# Patient Record
Sex: MALE | Race: BLACK OR AFRICAN AMERICAN | Employment: FULL TIME | URBAN - METROPOLITAN AREA
[De-identification: names, ages, dates, MRNs, and addresses within clinical notes are randomized per-mention and may not be internally consistent; named-entity substitution may affect disease eponyms.]

---

## 2017-04-26 ENCOUNTER — HOSPITAL ENCOUNTER (EMERGENCY)
Age: 22
Discharge: HOME | End: 2017-04-27
Attending: EMERGENCY MEDICINE
Payer: COMMERCIAL

## 2017-04-26 ENCOUNTER — APPOINTMENT (OUTPATIENT)
Dept: CT IMAGING | Age: 22
End: 2017-04-26
Attending: EMERGENCY MEDICINE
Payer: COMMERCIAL

## 2017-04-26 ENCOUNTER — APPOINTMENT (OUTPATIENT)
Dept: GENERAL RADIOLOGY | Age: 22
End: 2017-04-26
Attending: EMERGENCY MEDICINE
Payer: COMMERCIAL

## 2017-04-26 DIAGNOSIS — R06.00 DYSPNEA, UNSPECIFIED TYPE: Primary | ICD-10-CM

## 2017-04-26 DIAGNOSIS — R77.8 ELEVATED TROPONIN: ICD-10-CM

## 2017-04-26 LAB
ALBUMIN SERPL BCP-MCNC: 4 G/DL (ref 3.5–5)
ALBUMIN/GLOB SERPL: 1.1 {RATIO} (ref 1.1–2.2)
ALP SERPL-CCNC: 54 U/L (ref 45–117)
ALT SERPL-CCNC: 56 U/L (ref 12–78)
AMPHET UR QL SCN: NEGATIVE
ANION GAP BLD CALC-SCNC: 10 MMOL/L (ref 5–15)
AST SERPL W P-5'-P-CCNC: 22 U/L (ref 15–37)
BARBITURATES UR QL SCN: NEGATIVE
BASOPHILS # BLD AUTO: 0 K/UL (ref 0–0.1)
BASOPHILS # BLD: 0 % (ref 0–1)
BENZODIAZ UR QL: NEGATIVE
BILIRUB SERPL-MCNC: 0.4 MG/DL (ref 0.2–1)
BUN SERPL-MCNC: 13 MG/DL (ref 6–20)
BUN/CREAT SERPL: 11 (ref 12–20)
CALCIUM SERPL-MCNC: 8.8 MG/DL (ref 8.5–10.1)
CANNABINOIDS UR QL SCN: NEGATIVE
CHLORIDE SERPL-SCNC: 103 MMOL/L (ref 97–108)
CO2 SERPL-SCNC: 29 MMOL/L (ref 21–32)
COCAINE UR QL SCN: NEGATIVE
CREAT SERPL-MCNC: 1.22 MG/DL (ref 0.7–1.3)
D DIMER PPP FEU-MCNC: 0.19 MG/L FEU (ref 0–0.65)
DRUG SCRN COMMENT,DRGCM: NORMAL
EOSINOPHIL # BLD: 0.3 K/UL (ref 0–0.4)
EOSINOPHIL NFR BLD: 3 % (ref 0–7)
ERYTHROCYTE [DISTWIDTH] IN BLOOD BY AUTOMATED COUNT: 12.3 % (ref 11.5–14.5)
GLOBULIN SER CALC-MCNC: 3.8 G/DL (ref 2–4)
GLUCOSE SERPL-MCNC: 84 MG/DL (ref 65–100)
HCT VFR BLD AUTO: 47.1 % (ref 36.6–50.3)
HGB BLD-MCNC: 15.5 G/DL (ref 12.1–17)
LYMPHOCYTES # BLD AUTO: 22 % (ref 12–49)
LYMPHOCYTES # BLD: 1.9 K/UL (ref 0.8–3.5)
MCH RBC QN AUTO: 28 PG (ref 26–34)
MCHC RBC AUTO-ENTMCNC: 32.9 G/DL (ref 30–36.5)
MCV RBC AUTO: 85.2 FL (ref 80–99)
METHADONE UR QL: NEGATIVE
MONOCYTES # BLD: 1.1 K/UL (ref 0–1)
MONOCYTES NFR BLD AUTO: 13 % (ref 5–13)
NEUTS SEG # BLD: 5.5 K/UL (ref 1.8–8)
NEUTS SEG NFR BLD AUTO: 62 % (ref 32–75)
OPIATES UR QL: NEGATIVE
PCP UR QL: NEGATIVE
PLATELET # BLD AUTO: 234 K/UL (ref 150–400)
POTASSIUM SERPL-SCNC: 3.7 MMOL/L (ref 3.5–5.1)
PROT SERPL-MCNC: 7.8 G/DL (ref 6.4–8.2)
RBC # BLD AUTO: 5.53 M/UL (ref 4.1–5.7)
SODIUM SERPL-SCNC: 142 MMOL/L (ref 136–145)
TROPONIN I SERPL-MCNC: 0.09 NG/ML
TROPONIN I SERPL-MCNC: 0.13 NG/ML
WBC # BLD AUTO: 8.9 K/UL (ref 4.1–11.1)

## 2017-04-26 PROCEDURE — 84484 ASSAY OF TROPONIN QUANT: CPT | Performed by: EMERGENCY MEDICINE

## 2017-04-26 PROCEDURE — 71020 XR CHEST PA LAT: CPT

## 2017-04-26 PROCEDURE — 96361 HYDRATE IV INFUSION ADD-ON: CPT

## 2017-04-26 PROCEDURE — 99285 EMERGENCY DEPT VISIT HI MDM: CPT

## 2017-04-26 PROCEDURE — 96360 HYDRATION IV INFUSION INIT: CPT

## 2017-04-26 PROCEDURE — 85025 COMPLETE CBC W/AUTO DIFF WBC: CPT | Performed by: EMERGENCY MEDICINE

## 2017-04-26 PROCEDURE — 85379 FIBRIN DEGRADATION QUANT: CPT | Performed by: EMERGENCY MEDICINE

## 2017-04-26 PROCEDURE — 74011250637 HC RX REV CODE- 250/637: Performed by: EMERGENCY MEDICINE

## 2017-04-26 PROCEDURE — 80053 COMPREHEN METABOLIC PANEL: CPT | Performed by: EMERGENCY MEDICINE

## 2017-04-26 PROCEDURE — 80307 DRUG TEST PRSMV CHEM ANLYZR: CPT | Performed by: EMERGENCY MEDICINE

## 2017-04-26 PROCEDURE — 71275 CT ANGIOGRAPHY CHEST: CPT

## 2017-04-26 PROCEDURE — 74011250636 HC RX REV CODE- 250/636: Performed by: EMERGENCY MEDICINE

## 2017-04-26 PROCEDURE — 93005 ELECTROCARDIOGRAM TRACING: CPT

## 2017-04-26 PROCEDURE — 36415 COLL VENOUS BLD VENIPUNCTURE: CPT | Performed by: EMERGENCY MEDICINE

## 2017-04-26 PROCEDURE — 74011636320 HC RX REV CODE- 636/320: Performed by: EMERGENCY MEDICINE

## 2017-04-26 RX ORDER — SODIUM CHLORIDE 0.9 % (FLUSH) 0.9 %
10 SYRINGE (ML) INJECTION
Status: COMPLETED | OUTPATIENT
Start: 2017-04-26 | End: 2017-04-26

## 2017-04-26 RX ORDER — METOPROLOL TARTRATE 25 MG/1
25 TABLET, FILM COATED ORAL
Status: COMPLETED | OUTPATIENT
Start: 2017-04-26 | End: 2017-04-26

## 2017-04-26 RX ORDER — SODIUM CHLORIDE 9 MG/ML
50 INJECTION, SOLUTION INTRAVENOUS
Status: COMPLETED | OUTPATIENT
Start: 2017-04-26 | End: 2017-04-26

## 2017-04-26 RX ORDER — GUAIFENESIN 100 MG/5ML
81 LIQUID (ML) ORAL
Status: COMPLETED | OUTPATIENT
Start: 2017-04-26 | End: 2017-04-26

## 2017-04-26 RX ADMIN — SODIUM CHLORIDE 1000 ML: 900 INJECTION, SOLUTION INTRAVENOUS at 21:44

## 2017-04-26 RX ADMIN — Medication 10 ML: at 21:20

## 2017-04-26 RX ADMIN — IOPAMIDOL 80 ML: 755 INJECTION, SOLUTION INTRAVENOUS at 21:20

## 2017-04-26 RX ADMIN — ASPIRIN 81 MG CHEWABLE TABLET 81 MG: 81 TABLET CHEWABLE at 22:21

## 2017-04-26 RX ADMIN — SODIUM CHLORIDE 50 ML/HR: 900 INJECTION, SOLUTION INTRAVENOUS at 21:20

## 2017-04-26 RX ADMIN — METOPROLOL TARTRATE 25 MG: 25 TABLET ORAL at 22:21

## 2017-04-26 NOTE — ED NOTES
Bedside and Verbal shift change report given to Connor Jackman (oncoming nurse) by Minerva Lemus (offgoing nurse). Report included the following information SBAR, Kardex and Intake/Output.

## 2017-04-26 NOTE — ED PROVIDER NOTES
HPI Comments: Albino Shook is a 22 y.o. male with PMHx of migraines and seasonal allergies presenting ambulatory to [facility] c/o SOB and lightheadedness earlier today while at work. Pt notes that he had his episode of SOB at ~1615. He reports that EMS was called then, and he was brought to [facility]. However, the wait was too long, and while he was waiting he never saw a physician. Pt notes that he was fine this morning, and he felt normal at work earlier today. He reports that he does have allergies, but has not had any bad allergies recently. Per pt, his mother has hx of asthma. He denies chest pain, nausea, vomiting, coughing, increased stress, hx of asthma, use of allergy medications, daily medications, or ever using an inhaler. No recent plane travel; no recent trauma or surgery; no recent hospitalizations; no hemoptysis; no prior hx of dvt/pe;       PCP: Sarah Ng MD    There are no other complaints, changes, or physical findings at this time. The history is provided by the patient. No  was used. Past Medical History:   Diagnosis Date    H/O seasonal allergies     Headache     Migraines        History reviewed. No pertinent surgical history. History reviewed. No pertinent family history. Social History     Social History    Marital status: SINGLE     Spouse name: N/A    Number of children: N/A    Years of education: N/A     Occupational History    Not on file. Social History Main Topics    Smoking status: Never Smoker    Smokeless tobacco: Never Used    Alcohol use No    Drug use: No    Sexual activity: No     Other Topics Concern    Not on file     Social History Narrative         ALLERGIES: Review of patient's allergies indicates no known allergies. Review of Systems   Constitutional: Negative for activity change, chills and fever. HENT: Negative for congestion and sore throat. Eyes: Negative for pain and redness.    Respiratory: Positive for shortness of breath. Negative for cough and chest tightness. Cardiovascular: Negative for chest pain and palpitations. Gastrointestinal: Negative for abdominal pain, diarrhea, nausea and vomiting. Genitourinary: Negative for dysuria, frequency and urgency. Musculoskeletal: Negative for back pain and neck pain. Skin: Negative for rash. Neurological: Positive for light-headedness. Negative for syncope and headaches. Psychiatric/Behavioral: Negative for confusion. All other systems reviewed and are negative. Patient Vitals for the past 12 hrs:   BP SpO2   04/27/17 0730 122/72 98 %   04/27/17 0700 121/78 97 %   04/27/17 0630 120/83 97 %   04/27/17 0600 129/78 96 %   04/27/17 0543 119/69 96 %   04/27/17 0500 113/63 96 %   04/27/17 0430 116/75 95 %   04/27/17 0400 116/68 96 %   04/27/17 0330 152/63 96 %   04/27/17 0300 133/69 96 %   04/27/17 0230 117/72 95 %   04/27/17 0200 118/76 95 %   04/27/17 0130 132/81 97 %   04/27/17 0115 - 99 %   04/27/17 0114 131/78 -   04/26/17 2345 (!) 134/95 98 %   04/26/17 2330 128/73 100 %   04/26/17 2315 140/86 98 %   04/26/17 2300 135/79 98 %   04/26/17 2245 142/70 98 %   04/26/17 2230 131/83 99 %   04/26/17 2215 142/89 100 %   04/26/17 2045 136/83 99 %   04/26/17 2031 132/73 99 %            Physical Exam   Constitutional: He is oriented to person, place, and time. He appears well-developed and well-nourished. No distress. HENT:   Head: Normocephalic. Nose: Nose normal.   Mouth/Throat: Oropharynx is clear and moist. No oropharyngeal exudate. TMs clear bilaterally   Eyes: Conjunctivae are normal. Pupils are equal, round, and reactive to light. No scleral icterus. Neck: Normal range of motion. Neck supple. No JVD present. No tracheal deviation present. No thyromegaly present. Cardiovascular: Normal rate, regular rhythm and intact distal pulses. Exam reveals no gallop and no friction rub. No murmur heard.   Pulmonary/Chest: Effort normal and breath sounds normal. No stridor. No respiratory distress. He has no wheezes. He has no rales. Abdominal: Soft. Bowel sounds are normal. He exhibits no distension. There is no tenderness. There is no rebound and no guarding. Musculoskeletal: Normal range of motion. He exhibits no edema. Lymphadenopathy:     He has no cervical adenopathy. Neurological: He is alert and oriented to person, place, and time. No cranial nerve deficit. He exhibits normal muscle tone. Coordination normal.   Skin: Skin is warm and dry. No rash noted. He is not diaphoretic. No erythema. Psychiatric: He has a normal mood and affect. His behavior is normal.   Nursing note and vitals reviewed. MDM  Number of Diagnoses or Management Options  Dyspnea, unspecified type:   Elevated troponin:   Diagnosis management comments: DDx: seasonal allergies, bronchitis, bronchospasms, anxiety, ACS       Amount and/or Complexity of Data Reviewed  Clinical lab tests: ordered and reviewed  Tests in the radiology section of CPT®: ordered and reviewed  Tests in the medicine section of CPT®: ordered and reviewed  Review and summarize past medical records: yes  Discuss the patient with other providers: yes (Cardiology)  Independent visualization of images, tracings, or specimens: yes    Risk of Complications, Morbidity, and/or Mortality  General comments:    Pt PERC negative; trop in indeterminant range at 0.09; will repeat, along with obtaining d dimer; vs wnl; cbc, cmp unremarkable; clear cxr; ; repeat trop minimally increased to 0.13; consulted cardiology; started asa and po metoprolol; uds negative; pt without complaints; will hold overnight for cardiology to see in am; care signed out to Dr. Luis Armando Chau. Sherif Muñoz MD      Patient Progress  Patient progress: stable    ED Course       Procedures    ED EKG interpretation: 18:52  Rhythm: normal sinus rhythm; and regular . Rate (approx.): 70;  Axis: normal; MT Interval: normal; QRS interval: normal ; ST/T wave: non-specific changes; LVH by voltage criteria; This EKG was interpreted by Kerwin Orozco MD,ED Provider. 8:52 PM  Pt's mother is here and reports that pt had a recent PE with unknown cause and reports family hx of CAD although no significant early CAD. Mother reports no hx of sudden cardiac death and no FHX of sudden cardiac death. Written by Isabel Hernandez, MARIAN Scribe, as dictated by Kerwin Orozco MD.    CONSULT NOTE:  9:56 PM  Kerwin Orozco MD spoke with Dr. Chacorta Finch   Specialty: Cardiology  Discussed pt's hx, disposition, and available diagnostic and imaging results. Reviewed care plans. Consultant agrees with plans as outlined. Dr. Makayla Rueda wants to give pt 81 mg Aspirin and 25 mg metoprolol PO and keep pt overnight in the emergency department. He would like to repeat 6 hour set of cardiac enzymes. Dr. Makayla Rueda will follow up in am.  Written by Isabel Hernandez, MARIAN Scribe, as dictated by Kerwin Orozco MD.    SIGN OUT:  11:54 PM  Patient's presentation, labs/imaging and plan of care was reviewed with Arik Campbell MD as part of sign out. They will await for Dr. Makayla Rueda to come evaluate pt and repeat cardiac enzymes at 0400 as part of the plan discussed with the patient. Arik Campbell MD's assistance in completion of this plan is greatly appreciated but it should be noted that I will be the provider of record for this patient. Kerwin Orozco MD    This note is prepared by Isabel Hernandez, acting as Scribe for MD Kerwin Bolden MD: The scribe's documentation has been prepared under my direction and personally reviewed by me in its entirety. I confirm that the note above accurately reflects all work, treatment, procedures, and medical decision making performed by me. Progress Note:  5:54 AM  The patient's repeat troponin was 0.07. Still awaiting Dr. Patrick Gooden consult.   Written by Alfreda Mcgregor, MARIAN Scribe, as dictated by Arik Campbell MD.    BEDSIDE SIGN OUT:  5:55 AM  Discussed pt's hx, disposition, and available diagnostic and imaging results with Michelle Zavala MD at bedside with the patient. Reviewed care plans. Both providers and patient are in agreement with care plan. Rudy Aase, MD is transferring care of the pt to Michelle Zavala MD at this time. Patient's presentation, labs/imaging and plan of care was reviewed with Michelle Zavala MD as part of sign out. They will continue care plan as part of the plan discussed with the patient. Michelle Zavala MD's assistance in completion of this plan is greatly appreciated but it should be noted that I will be the provider of record for this patient. This note is prepared by Lee Cannon, acting as a Scribe for Rudy Aase, MD.    Rudy Aase, MD: The scribe's documentation has been prepared under my direction and personally reviewed by me in its entirety. I confirm that the notes above accurately reflects all work, treatment, procedures, and medical decision making performed by me. Consult Note:  8:10 AM  Michelle Zavala MD spoke with Hernandez Gonzalez MD  Specialty: Cardiology  Discussed pts hx, disposition, and available diagnostic and imaging results. Reviewed care plans. Consultant agrees with plans as outlined. He states pt can be discharged, does not need to make an appointment, and can call office as needed.     LABORATORY TESTS:  Recent Results (from the past 12 hour(s))   TROPONIN I    Collection Time: 04/26/17  9:04 PM   Result Value Ref Range    Troponin-I, Qt. 0.13 (H) <0.05 ng/mL   D DIMER    Collection Time: 04/26/17  9:04 PM   Result Value Ref Range    D-dimer 0.19 0.00 - 0.65 mg/L FEU   DRUG SCREEN, URINE    Collection Time: 04/26/17  9:55 PM   Result Value Ref Range    AMPHETAMINE NEGATIVE  NEG      BARBITURATES NEGATIVE  NEG      BENZODIAZEPINE NEGATIVE  NEG      COCAINE NEGATIVE  NEG      METHADONE NEGATIVE  NEG      OPIATES NEGATIVE  NEG PCP(PHENCYCLIDINE) NEGATIVE  NEG      THC (TH-CANNABINOL) NEGATIVE  NEG      Drug screen comment (NOTE)    EKG, 12 LEAD, INITIAL    Collection Time: 04/27/17  4:11 AM   Result Value Ref Range    Ventricular Rate 63 BPM    Atrial Rate 63 BPM    P-R Interval 160 ms    QRS Duration 96 ms    Q-T Interval 380 ms    QTC Calculation (Bezet) 388 ms    Calculated P Axis 40 degrees    Calculated R Axis -42 degrees    Calculated T Axis 25 degrees    Diagnosis       Normal sinus rhythm  Left axis deviation  Voltage criteria for left ventricular hypertrophy  Abnormal ECG  When compared with ECG of 26-APR-2017 18:52,  MANUAL COMPARISON REQUIRED, DATA IS UNCONFIRMED     CK W/ CKMB & INDEX    Collection Time: 04/27/17  4:18 AM   Result Value Ref Range     39 - 308 U/L    CK - MB 1.1 <3.6 NG/ML    CK-MB Index 0.5 0 - 2.5     TROPONIN I    Collection Time: 04/27/17  4:18 AM   Result Value Ref Range    Troponin-I, Qt. 0.07 (H) <0.05 ng/mL       IMAGING RESULTS:  CT Results  (Last 48 hours)               04/26/17 2114  CTA CHEST W OR W WO CONT Final result    Impression:  IMPRESSION: No evidence pulmonary embolism. Clear lungs. Narrative:  INDICATION: Chest pain. Acute onset of shortness of breath today at 4:15 PM.       CT pulmonary angiogram is performed with 2.5 mm collimation. 80 mL of nonionic   IV Isovue-370 was administered for exam. 3D coronal and sagittal postprocessed   images were performed for this examination. CT dose reduction was achieved through use of a standardized protocol tailored   for this examination and automatic exposure control for dose modulation. The pulmonary arteries are well opacified and there is no evidence of pulmonary   embolism. Thoracic aorta is normal in course and caliber and there is no aortic   dissection. There is no axillary, mediastinal or hilar lymphadenopathy. The   heart is normal in the size and there is no pericardial effusion.  The pleural   spaces are normal. The lung parenchyma is clear bilaterally. The visualized   upper abdominal structures are normal.               CXR Results  (Last 48 hours)               04/26/17 1901  XR CHEST PA LAT Final result    Impression:  IMPRESSION: No acute cardiopulmonary disease. Narrative: Indication: Chest pain, shortness of breath, lightheadedness. Shortness of   breath started today at 4:15 PM.       Exam: PA and lateral views of the chest.       There is no prior study for direct comparison. Findings: Cardiomediastinal silhouette is within normal limits. Lungs are clear   bilaterally. Pleural spaces are normal. Osseous structures are intact. MEDICATIONS GIVEN:  Medications   sodium chloride 0.9 % bolus infusion 1,000 mL (0 mL IntraVENous IV Completed 4/27/17 0026)   0.9% sodium chloride infusion (0 mL/hr IntraVENous IV Completed 4/26/17 2144)   iopamidol (ISOVUE-370) 76 % injection 100 mL (80 mL IntraVENous Given 4/26/17 2120)   sodium chloride (NS) flush 10 mL (10 mL IntraVENous Given 4/26/17 2120)   aspirin chewable tablet 81 mg (81 mg Oral Given 4/26/17 2221)   metoprolol tartrate (LOPRESSOR) tablet 25 mg (25 mg Oral Given 4/26/17 2221)       IMPRESSION:  1. Dyspnea, unspecified type    2. Elevated troponin        PLAN:  1. Discharge    DISCHARGE NOTE:  8:14 AM  The patient is ready for discharge. The patients signs, symptoms, diagnosis, and instructions for discharge have been discussed and the pt has conveyed their understanding. The patient is to follow up as recommended with PCP or return to the ER should their symptoms worsen. Plan has been discussed and patient has conveyed their agreement. This note is prepared by Jessi Archibald, acting as Scribe for Yoanna Hodgson MD.    Yoanna Hodgson MD: The scribe's documentation has been prepared under my direction and personally reviewed by me in its entirety.  I confirm that the note above accurately reflects all work, treatment, procedures, and medical decision making performed by me.

## 2017-04-26 NOTE — ED NOTES
Assumed care of patient and verbal bedside report from Norah Brown RN. Patient has no signs or symptoms of acute distress. Resting quietly in a position of comfort on stretcher with call bell in reach, side rail x 1 on the left, bed low, family at bedside.

## 2017-04-26 NOTE — ED NOTES
Assumed care of pt. Pt. Placed in position of comfort. Call bell within reach. Pt. Made aware of care plan. Pt came in with complaints of SOB and lightheadedness. Pt stated that the SOB has resolved but he is still lightheaded.

## 2017-04-26 NOTE — LETTER
[place] 
[facility] EMERGENCY DEPT 
[address] [facility] [id number] [phone] Work/School Note Date: 4/26/2017 To Whom It May concern: 
 
Dave Newsome was seen and treated today in the emergency room by the following provider(s): 
Attending Provider: Nanda Thomas MD 
Physician: Asif Bhakta MD. Please excuse Dalila Garcia from work on 4/27/2017. He was in the emergency department with his son from 4/26/2017 through 4/27/2017. Sincerely, Nanda Thomas MD

## 2017-04-27 VITALS
HEIGHT: 71 IN | SYSTOLIC BLOOD PRESSURE: 122 MMHG | OXYGEN SATURATION: 98 % | TEMPERATURE: 97.8 F | BODY MASS INDEX: 28.64 KG/M2 | WEIGHT: 204.59 LBS | DIASTOLIC BLOOD PRESSURE: 72 MMHG | RESPIRATION RATE: 16 BRPM | HEART RATE: 78 BPM

## 2017-04-27 LAB
ATRIAL RATE: 63 BPM
ATRIAL RATE: 70 BPM
CALCULATED P AXIS, ECG09: 37 DEGREES
CALCULATED P AXIS, ECG09: 40 DEGREES
CALCULATED R AXIS, ECG10: -42 DEGREES
CALCULATED R AXIS, ECG10: -47 DEGREES
CALCULATED T AXIS, ECG11: 25 DEGREES
CALCULATED T AXIS, ECG11: 43 DEGREES
CK MB CFR SERPL CALC: 0.5 % (ref 0–2.5)
CK MB SERPL-MCNC: 1.1 NG/ML (ref 5–25)
CK SERPL-CCNC: 222 U/L (ref 39–308)
DIAGNOSIS, 93000: NORMAL
DIAGNOSIS, 93000: NORMAL
P-R INTERVAL, ECG05: 156 MS
P-R INTERVAL, ECG05: 160 MS
Q-T INTERVAL, ECG07: 374 MS
Q-T INTERVAL, ECG07: 380 MS
QRS DURATION, ECG06: 96 MS
QRS DURATION, ECG06: 98 MS
QTC CALCULATION (BEZET), ECG08: 388 MS
QTC CALCULATION (BEZET), ECG08: 403 MS
TROPONIN I SERPL-MCNC: 0.07 NG/ML
VENTRICULAR RATE, ECG03: 63 BPM
VENTRICULAR RATE, ECG03: 70 BPM

## 2017-04-27 PROCEDURE — 84484 ASSAY OF TROPONIN QUANT: CPT | Performed by: INTERNAL MEDICINE

## 2017-04-27 PROCEDURE — 93005 ELECTROCARDIOGRAM TRACING: CPT

## 2017-04-27 PROCEDURE — 82550 ASSAY OF CK (CPK): CPT | Performed by: INTERNAL MEDICINE

## 2017-04-27 PROCEDURE — 36415 COLL VENOUS BLD VENIPUNCTURE: CPT | Performed by: INTERNAL MEDICINE

## 2017-04-27 RX ORDER — ASPIRIN 81 MG/1
81 TABLET ORAL DAILY
Qty: 30 TAB | Refills: 6 | Status: SHIPPED | OUTPATIENT
Start: 2017-04-27 | End: 2017-05-27

## 2017-04-27 NOTE — CONSULTS
[address]   [address] [facility]       Name:  Larry Mtz   MR#:  [id number]   :  1995   Account #:  [de-identified]    Date of Consultation:  2017   Date of Adm:  2017       He was seen in the emergency room at the request of Dr. Mohan Unger. This is a 27-year-old [facility] [employer] male brought to the emergency   room by his mother. 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 He has not had any history of anything like this   before. He just became very short of breath. He had no other   associated symptoms. He had no chest pain or any other cardiac   symptoms. Rescue squad was called. He said that they checked his blood   pressure and his pulse oximetry and took him to [facility]. He   said he waited there for a while and was not seen by a physician and   they left. His mother put him in the car and drove him over here. On the   way here his breathing was fine, but he was a bit dizzy, and says he   just felt dizziness and a little unsteady. He had no chest discomfort,   nausea, vomiting, or other symptoms. His symptoms resolved without   treatment and he has been fine overnight. He has no history of any medical conditions. He has no hypertension,   diabetes, asthma, bronchitis or any other chronic medical conditions. In the ER here, they did a troponin level. His troponin level was 0.09. He had a repeat troponin that was 0.1, and had a repeat this morning   of 0.07. He did not have a CK done last night. I ordered a CK for this   morning and CK this morning is normal.    EKG shows normal sinus rhythm with some early repolarization   changes and left axis. Repeat EKG this morning is unchanged. This is   within normal limits for his age group. He feels well at this time. He had   no problems overnight. MEDICATIONS   1.  Imitrex p.r.n.   2. Hydrocodone p.r.n. for headaches. ALLERGIES: NONE. SOCIAL HISTORY: Nonsmoker. FAMILY HISTORY: Heart disease and asthma. REVIEW OF SYSTEMS: Comprehensive review of systems shows   the above findings, otherwise unremarkable. PHYSICAL EXAMINATION   GENERAL: He is a young, healthy-appearing male in no distress. He is   awake, alert, and oriented. VITAL SIGNS: Afebrile, pulse 75, blood pressure running about   120/80, respirations 18, saturation 97% on room air. HEENT: Atraumatic. SKIN: Warm and dry. LUNGS: Clear to auscultation. HEART: Regular rhythm and rate. No murmurs, rubs, or gallops. CHEST: No tenderness. ABDOMEN: Soft, nontender. EXTREMITIES: No cyanosis, clubbing or edema. NEUROLOGIC: Grossly normal. No focal motor deficits. CT angio was done last night and there was no evidence for   pulmonary embolism. Lungs were clear. LABORATORY STUDIES: The CBC was normal. His CMP was   normal.    ASSESSMENT   1. Shortness of breath, uncertain etiology. 2. Indeterminate minor elevation of the troponin level. RECOMMENDATIONS: I do not think there is any indication that this is   a cardiac issue in this 36-year-old male. He is having no cardiac   symptoms and I do not think further hospital studies are needed. Would have him follow up as an outpatient and we can see him as   needed.         Nando Muñiz MD      [address] [facility]   D:  04/27/2017   08:28   T:  04/27/2017   09:06   Job #:  [id number]

## 2017-04-27 NOTE — ED NOTES
Bedside and verbal shift change report received from [facility]. Patient is resting comfortably in the bed connected to the monitor, regular heart rate of 88, BP within normal limits post IV Metoprolol. Patient complains of no chest pain, denies shortness of breath, and denies n/v/d. Lights dim, call bell in hand, side rails up. IV Saline Locked.

## 2017-04-27 NOTE — ED NOTES
Patient requesting food to eat. Spoke with Dr Shilpa Gustafson and he stated it would be ok for the patient to have small meal once but NPO after that. Updated patient. Patient given meal tray and ginger ale.  Verbalized understanding of NPO order after the meal.

## 2017-04-27 NOTE — ED NOTES
Hourly rounding done. Patient resting quietly in position of comfort with call bell in reach. No signs or symptoms of acute distress noted at this time. No needs or wants verbalized at this time. Patient given pillow per request. Bed low, side rail x 1 on right, monitor x 2.

## 2017-04-27 NOTE — DISCHARGE INSTRUCTIONS

## 2017-04-27 NOTE — ED NOTES
Checked on patient. Reminded him he is now NPO. Updated him on pending lab work and EKG to be done about 4 am today. Patient resting in position of comfort with call bell in reach, monitor x 2, side rails x 1 on right side, bed low.  Lowered lights per patients request.

## 2017-04-27 NOTE — PROGRESS NOTES
[address] yo male with SOB while at work . [place] [facility] , so came here. Asymptomatic on arrival  And since . CT chest - no PE    Slight elevation Troponin. No CK  Done. Tox screen negative. EKG NSR with early repolarization changes,  Normal       Not likely cardiac . Atypical presentation, no angina. Incidental increase Troponin, no CK done. Nothing on EKG. Repeat Troponin with CK in AM.   EKG .

## 2017-04-27 NOTE — ED NOTES
Went in to check on patient due to blood pressure reading being low. Patient was just waking up. Readjusted patients blood pressure cuff and updated patient on results from morning labs. Verbalized understanding and awaiting further MD evaluation. Call bell in reach, side rails x 1 on right, bed low, monitor x 2.

## 2017-04-27 NOTE — ED NOTES
Hourly rounding done. Patient resting quietly in position of comfort with call bell in reach. No signs or symptoms of acute distress noted at this time. No needs or wants verbalized at this time. Educated patient and family on medications being given. They verbalized understanding. Bed low, side rail x 1 on right, family at bedside, monitor x 2.

## 2017-04-27 NOTE — PROGRESS NOTES
Consult dictated. Troponin indeterminate, CK normal .    EKG unchanged. NSR , LAD, early repolarization. WNL for age. No indication of any cardiac issue. OK to discharge . Can f/u with his PCP . Will see PRN.

## 2017-04-27 NOTE — ED NOTES
Hourly rounding done. Patient sleeping in position of comfort with call bell in reach. No signs or symptoms of acute distress noted at this time. No needs or wants verbalized at this time.

## 2017-04-27 NOTE — ED NOTES
Discharge instructions reviewed with patient and mother by the MD. Patient ambulatory out of the ER,

## 2017-04-27 NOTE — ED NOTES
Hourly rounding done. Patient resting quietly in position of comfort with call bell in reach. No signs or symptoms of acute distress noted at this time. No needs or wants verbalized at this time. Bed low, side rail x 1 on right side, family at bedside, monitor x 2.

## 2017-06-08 ENCOUNTER — HOSPITAL ENCOUNTER (EMERGENCY)
Age: 22
Discharge: HOME | End: 2017-06-08
Attending: EMERGENCY MEDICINE
Payer: SELF-PAY

## 2017-06-08 VITALS
OXYGEN SATURATION: 99 % | BODY MASS INDEX: 25.73 KG/M2 | TEMPERATURE: 97.7 F | SYSTOLIC BLOOD PRESSURE: 158 MMHG | HEART RATE: 78 BPM | WEIGHT: 190 LBS | DIASTOLIC BLOOD PRESSURE: 96 MMHG | HEIGHT: 72 IN | RESPIRATION RATE: 18 BRPM

## 2017-06-08 DIAGNOSIS — Z71.1 CONCERN ABOUT STD IN MALE WITHOUT DIAGNOSIS: Primary | ICD-10-CM

## 2017-06-08 PROCEDURE — 87491 CHLMYD TRACH DNA AMP PROBE: CPT | Performed by: EMERGENCY MEDICINE

## 2017-06-08 PROCEDURE — 99282 EMERGENCY DEPT VISIT SF MDM: CPT

## 2017-06-08 NOTE — ED PROVIDER NOTES
HPI Comments: Vish Booker is a 22 y.o. male with no pertinent PMHx who presents ambulatory to the ED seeking evaluation for a recent possible exposure to STD. Pt states that his partner has experienced dysuria x yesterday. He states that he is not currently experiencing any symptoms but would still like to get tested. Pt notes that he was recently tested for any STD and the results were negative but he reports being sexually active with the same partner since then. He denies any daily medications or hx of longstanding diseases. Pt specifically denies dysuria, penile discharge, fever, chills, nausea, or vomiting. Social hx: - Tobacco use, - EtOH use, - Illicit drug use    PCP: Jethro Childers MD    There are no other complaints, changes or physical findings at this time. The history is provided by the patient. No  was used. Past Medical History:   Diagnosis Date    H/O seasonal allergies     Headache     Migraines        No past surgical history on file. No family history on file. Social History     Social History    Marital status: SINGLE     Spouse name: N/A    Number of children: N/A    Years of education: N/A     Occupational History    Not on file. Social History Main Topics    Smoking status: Never Smoker    Smokeless tobacco: Never Used    Alcohol use No    Drug use: No    Sexual activity: Yes     Partners: Male     Birth control/ protection: None     Other Topics Concern    Not on file     Social History Narrative         ALLERGIES: Review of patient's allergies indicates no known allergies. Review of Systems   Constitutional: Negative for chills, diaphoresis, fever and unexpected weight change. HENT: Negative for congestion and sore throat. Eyes: Negative for discharge and visual disturbance. Respiratory: Negative for cough and shortness of breath. - sputum production   Cardiovascular: Negative for chest pain.         - dyspnea on exertion   Gastrointestinal: Negative for abdominal pain, diarrhea, nausea and vomiting. Endocrine: Negative for polydipsia, polyphagia and polyuria. Genitourinary: Negative for discharge, dysuria and frequency. Musculoskeletal: Negative for arthralgias and neck pain. Skin: Negative for rash. - lesion   Allergic/Immunologic: Negative for environmental allergies, food allergies and immunocompromised state. Neurological: Negative for seizures, syncope and headaches. Hematological: Negative for adenopathy. Does not bruise/bleed easily. Psychiatric/Behavioral: Negative for behavioral problems, hallucinations and sleep disturbance. Vitals:    06/08/17 0556   BP: (!) 158/96   Pulse: 78   Resp: 18   Temp: 97.7 °F (36.5 °C)   SpO2: 99%   Weight: 86.2 kg (190 lb)   Height: 6' (1.829 m)            Physical Exam   Constitutional: He is oriented to person, place, and time. He appears well-nourished. No distress. HENT:   Head: Normocephalic and atraumatic. Mouth/Throat: Oropharynx is clear and moist.   Eyes: Conjunctivae are normal. Right eye exhibits no discharge. Left eye exhibits no discharge. Neck: Neck supple. No tracheal deviation present. Cardiovascular: Normal rate and regular rhythm. Exam reveals no gallop and no friction rub. No murmur heard. Pulmonary/Chest: Breath sounds normal. No respiratory distress. He has no wheezes. He has no rales. No rhonchi   Abdominal: Soft. Bowel sounds are normal. He exhibits no distension. There is no tenderness. Genitourinary:   Genitourinary Comments: No lesions, no discharge. Musculoskeletal: He exhibits no edema or tenderness. Lymphadenopathy:     He has no cervical adenopathy. Neurological: He is alert and oriented to person, place, and time. Skin: Skin is warm. No rash noted. He is not diaphoretic. Psychiatric: He has a normal mood and affect.  Thought content normal.        MDM  Number of Diagnoses or Management Options  Concern about STD in male without diagnosis:   Diagnosis management comments: DDx: concern for STD       Amount and/or Complexity of Data Reviewed  Clinical lab tests: ordered and reviewed  Review and summarize past medical records: yes    Patient Progress  Patient progress: stable    ED Course       Procedures    Progress Note:  6:06 AM  Pt declines empiric STD treatment at this time, stating that he would prefer to wait on the lab results. Written by Lorie López, ED Scribe, as dictated by Rupa Delaney MD.      IMPRESSION:  1. Concern about STD in male without diagnosis        PLAN:  1. Current Discharge Medication List        2. Follow-up Information     Follow up With Details Comments [address] [place] [address] Street, MD   [address] [facility] [address] [phone]          Return to ED if worse     DISCHARGE NOTE  6:17 AM  The patient has been re-evaluated and is ready for discharge. Reviewed available results with patient. Counseled pt on diagnosis and care plan. Pt has expressed understanding, and all questions have been answered. Pt agrees with plan and agrees to F/U as recommended, or return to the ED if their sxs worsen. Discharge instructions have been provided and explained to the pt, along with reasons to return to the ED. This note is prepared by Bhavesh Hidalgo, acting as Scribe for Rupa Delaney MD.    Rupa Delaney MD: The scribe's documentation has been prepared under my direction and personally reviewed by me in its entirety. I confirm that the note above accurately reflects all work, treatment, procedures, and medical decision making performed by me.

## 2017-06-08 NOTE — ED TRIAGE NOTES
Emergency Department Nursing Plan of Care       The Nursing Plan of Care is developed from the Nursing assessment and Emergency Department Attending provider initial evaluation. The plan of care may be reviewed in the ED Provider note.     The Plan of Care was developed with the following considerations:   Patient / Family readiness to learn indicated by:verbalized understanding  Persons(s) to be included in education: patient  Barriers to Learning/Limitations:No    Signed     Rajni Benavides RN    6/8/2017   6:08 AM

## 2017-06-08 NOTE — ED NOTES
Discharge Instructions Reviewed with patient. Discharge instructions given to patient per this nurse. patient able to return verbalize discharge instructions. Paper copy of discharge instructions given. 0 RX given to patient per this nurse. Patient condition stable, Respiratory status WNL, Neurostatus intact. patient out of er, to home with self.

## 2017-06-09 LAB
C TRACH DNA SPEC QL NAA+PROBE: NEGATIVE
N GONORRHOEA DNA SPEC QL NAA+PROBE: NEGATIVE
SAMPLE TYPE: NORMAL
SERVICE CMNT-IMP: NORMAL
SPECIMEN SOURCE: NORMAL

## 2018-03-12 ENCOUNTER — HOSPITAL ENCOUNTER (EMERGENCY)
Age: 23
Discharge: HOME | End: 2018-03-12
Attending: EMERGENCY MEDICINE
Payer: SELF-PAY

## 2018-03-12 VITALS
WEIGHT: 203.04 LBS | HEIGHT: 72 IN | RESPIRATION RATE: 14 BRPM | HEART RATE: 64 BPM | SYSTOLIC BLOOD PRESSURE: 133 MMHG | OXYGEN SATURATION: 99 % | DIASTOLIC BLOOD PRESSURE: 69 MMHG | TEMPERATURE: 98.3 F | BODY MASS INDEX: 27.5 KG/M2

## 2018-03-12 DIAGNOSIS — Z23 NEED FOR TETANUS BOOSTER: ICD-10-CM

## 2018-03-12 DIAGNOSIS — S91.332A PUNCTURE WOUND OF LEFT FOOT, INITIAL ENCOUNTER: Primary | ICD-10-CM

## 2018-03-12 PROCEDURE — 90715 TDAP VACCINE 7 YRS/> IM: CPT | Performed by: PHYSICIAN ASSISTANT

## 2018-03-12 PROCEDURE — 74011250636 HC RX REV CODE- 250/636: Performed by: PHYSICIAN ASSISTANT

## 2018-03-12 PROCEDURE — 99283 EMERGENCY DEPT VISIT LOW MDM: CPT

## 2018-03-12 PROCEDURE — 90471 IMMUNIZATION ADMIN: CPT

## 2018-03-12 PROCEDURE — 74011250637 HC RX REV CODE- 250/637: Performed by: PHYSICIAN ASSISTANT

## 2018-03-12 RX ORDER — SUMATRIPTAN 50 MG/1
50 TABLET, FILM COATED ORAL
COMMUNITY
End: 2020-09-03

## 2018-03-12 RX ORDER — CEPHALEXIN 250 MG/1
500 CAPSULE ORAL
Status: COMPLETED | OUTPATIENT
Start: 2018-03-12 | End: 2018-03-12

## 2018-03-12 RX ORDER — LEVOFLOXACIN 750 MG/1
750 TABLET ORAL
Status: COMPLETED | OUTPATIENT
Start: 2018-03-12 | End: 2018-03-12

## 2018-03-12 RX ORDER — CEPHALEXIN 500 MG/1
500 CAPSULE ORAL 3 TIMES DAILY
Qty: 12 CAP | Refills: 0 | Status: SHIPPED | OUTPATIENT
Start: 2018-03-12 | End: 2018-03-16

## 2018-03-12 RX ORDER — LEVOFLOXACIN 750 MG/1
750 TABLET ORAL DAILY
Qty: 4 TAB | Refills: 0 | Status: SHIPPED | OUTPATIENT
Start: 2018-03-12 | End: 2018-03-16

## 2018-03-12 RX ADMIN — LEVOFLOXACIN 750 MG: 750 TABLET, FILM COATED ORAL at 20:57

## 2018-03-12 RX ADMIN — TETANUS TOXOID, REDUCED DIPHTHERIA TOXOID AND ACELLULAR PERTUSSIS VACCINE, ADSORBED 0.5 ML: 5; 2.5; 8; 8; 2.5 SUSPENSION INTRAMUSCULAR at 20:58

## 2018-03-12 RX ADMIN — CEPHALEXIN 500 MG: 250 CAPSULE ORAL at 20:57

## 2018-03-12 NOTE — LETTER
[address] 
[facility] EMERGENCY DEPT 
[address] [facility] [address] [id number] [phone] Work/School Note Date: 3/12/2018 To Whom It May concern: 
 
Juan Christiansen was seen and treated today in the emergency room by the following provider(s): 
Attending Provider: Zoie Solis MD 
Physician Assistant: Anil Curiel PA-C. Juan Christiansen may return to work on 14 March 2018. Sincerely, Anil Curiel PA-C

## 2018-03-13 NOTE — ED NOTES
Patient reports that he stepped on nail earlier at work. Patient reports the nail barely went through his shoe. Patient had a bandage from home in place upon arrival. Bleeding controlled. Tetanus not up to date.

## 2018-03-13 NOTE — ED PROVIDER NOTES
EMERGENCY DEPARTMENT HISTORY AND PHYSICAL EXAM      Date: 3/12/2018  Patient Name: Nate Elmore    History of Presenting Illness     Chief Complaint   Patient presents with    Foot Injury     stepped on nail at work  1900       History Provided By: Patient    HPI: Nate Elmore, 25 y.o. male with PMHx significant for migraines, presents via wheelchair to the ED for further evaluation of left foot pain s/p puncture wound while at work this evening. He reports that while at work he was walking and stepped on a nail noting that it went through his work boot causing his discomfort. The pt discloses that the nail was not embedded in his foot and was bleeding leading him to the ED. He notes that his discomfort is exacerbated with ambulation but denies taking any medication for pain PTA. The pt states that he is unsure if his tetanus is UTD. He denies any fevers, chills, chest pain, SOB, abdominal pain, nausea, vomiting, or diarrhea. PCP: Joyce Sutton MD   SHx: (-) tobacco; (-) ETOH; (-) Illicit drug use    There are no other complaints, changes, or physical findings at this time. Current Outpatient Prescriptions   Medication Sig Dispense Refill    SUMAtriptan (IMITREX) 50 mg tablet Take 50 mg by mouth once as needed for Migraine.  levoFLOXacin (LEVAQUIN) 750 mg tablet Take 1 Tab by mouth daily for 4 days. 4 Tab 0    cephALEXin (KEFLEX) 500 mg capsule Take 1 Cap by mouth three (3) times daily for 4 days. 12 Cap 0       Past History     Past Medical History:  Past Medical History:   Diagnosis Date    H/O seasonal allergies     Headache(784.0)     Migraines        Past Surgical History:  History reviewed. No pertinent surgical history. Family History:  History reviewed. No pertinent family history.     Social History:  Social History   Substance Use Topics    Smoking status: Never Smoker    Smokeless tobacco: Never Used    Alcohol use No       Allergies:  No Known Allergies      Review of Systems   Review of Systems   Constitutional: Negative for chills, diaphoresis and fever. HENT: Negative for rhinorrhea and sore throat. Eyes: Negative for pain. Respiratory: Negative for shortness of breath, wheezing and stridor. Cardiovascular: Negative for chest pain and leg swelling. Gastrointestinal: Negative for abdominal pain, diarrhea, nausea and vomiting. Genitourinary: Negative for difficulty urinating, dysuria, flank pain and hematuria. Musculoskeletal: Positive for arthralgias (left foot ). Negative for back pain and neck stiffness. Skin: Positive for wound (puncture wound left foot ). Negative for rash. Neurological: Negative for dizziness, seizures, syncope, weakness, light-headedness and headaches. Psychiatric/Behavioral: Negative for behavioral problems and confusion. Physical Exam   Physical Exam   Constitutional: He is oriented to person, place, and time. He appears well-developed and well-nourished. No distress. HENT:   Head: Normocephalic and atraumatic. Right Ear: External ear normal.   Left Ear: External ear normal.   Nose: Nose normal.   Eyes: Conjunctivae and EOM are normal. Right eye exhibits no discharge. Left eye exhibits no discharge. No scleral icterus. Neck: Normal range of motion. Neck supple. Cardiovascular: Normal rate, regular rhythm and normal heart sounds. No murmur heard. Pulmonary/Chest: Effort normal and breath sounds normal. No stridor. No respiratory distress. He has no decreased breath sounds. He has no wheezes. Abdominal: Soft. Bowel sounds are normal. He exhibits no distension. There is no tenderness. There is no rebound. Musculoskeletal: Normal range of motion. He exhibits no edema or tenderness. Feet:    Neurological: He is alert and oriented to person, place, and time. Skin: Skin is warm and dry. No rash noted. He is not diaphoretic.    Pinpoint puncture wound to the plantar surface of the left foot near the 4th digit, there is no appreciable foreign body, no current bleeding or drainage, the wound appears to only enter the epidermis with no dermal involvement, no erythema, no streaking   Psychiatric: He has a normal mood and affect. Judgment normal.   Nursing note and vitals reviewed. Diagnostic Study Results       Medical Decision Making   I am the first provider for this patient. I reviewed the vital signs, available nursing notes, past medical history, past surgical history, family history and social history. Vital Signs-Reviewed the patient's vital signs. Patient Vitals for the past 12 hrs:   Temp Pulse Resp BP SpO2   03/12/18 2038 98.3 °F (36.8 °C) 64 14 133/69 99 %       Pulse Oximetry Analysis - 99% on room air    Cardiac Monitor:   Rate: 64 bpm    Records Reviewed: Nursing Notes, Old Medical Records, Previous Radiology Studies and Previous Laboratory Studies    Provider Notes (Medical Decision Making):     DDx: Puncture wound, Cellulitis, Abscess, need for Tetanus update. ED Course:   Initial assessment performed. The patients presenting problems have been discussed, and they are in agreement with the care plan formulated and outlined with them. I have encouraged them to ask questions as they arise throughout their visit. Progress Notes:    8:51 PM  The pt has been re-evaluated. He was updated on the plan for discharge with abx coverage and need for tetanus update. Procedure Note - Wound Care  8:52 PM   Performed by: Keenan Aaron PA-C  The pt's wound to the plantar surface of the left foot has been cleansed with Chloroprep and chlorhexidine and covered with Xeroform and a bandage was placed. The procedure took 1-15 minutes, and pt tolerated well. Critical Care Time: 0 minutes    Disposition:  Discharge Note:  9:00 PM  The patient is ready for discharge.  The patient's signs, symptoms, diagnosis, and discharge instruction have been discussed and the patient has conveyed their understanding. The patient is to follow up as recommended or return to the ER should their symptoms worsen. Plan has been discussed and the patient is in agreement. Written by Joseline Mayers ED Scribe, as dictated by Hugo Mansfield PA-C    PLAN:  1. Discharge home  2. Medications as directed  3. Schedule f/u with PCP  4. Return precautions reviewed    Discharge Medication List as of 3/12/2018  9:00 PM      START taking these medications    Details   levoFLOXacin (LEVAQUIN) 750 mg tablet Take 1 Tab by mouth daily for 4 days. , Normal, Disp-4 Tab, R-0      cephALEXin (KEFLEX) 500 mg capsule Take 1 Cap by mouth three (3) times daily for 4 days. , Normal, Disp-12 Cap, R-0         CONTINUE these medications which have NOT CHANGED    Details   SUMAtriptan (IMITREX) 50 mg tablet Take 50 mg by mouth once as needed for Migraine., Historical Med           2. Follow-up Information     Follow up With Details Comments 111  [facility] In 3 days For wound re-check [address] [place] [address]  [phone]      [facility] EMERGENCY DEPT  As needed, If symptoms worsen, For wound re-check [address] [place] [address]  [phone]        Return to ED if worse     Diagnosis     Clinical Impression:   1. Puncture wound of left foot, initial encounter    2. Need for tetanus booster        Attestations:    Attestation: This note is prepared by Sis Dexter. Talib, acting as Scribe for Hugo Mansfield PA-C. Hugo Mansfield PA-C: The scribe's documentation has been prepared under my direction and personally reviewed by me in its entirety. I confirm that the note above accurately reflects all work, treatment, procedures, and medical decision making performed by me.

## 2018-03-13 NOTE — ED NOTES
PA has reviewed discharge instructions with the patient. The patient verbalized understanding. Pt discharged with written instructions. No further concerns at this time.

## 2018-03-13 NOTE — ED TRIAGE NOTES
Stepped on nail at work around 1900. Here for evaluation puncture wound left foot. Unknown last tetanus vaccination.

## 2018-03-13 NOTE — DISCHARGE INSTRUCTIONS

## 2018-06-06 ENCOUNTER — HOSPITAL ENCOUNTER (EMERGENCY)
Age: 23
Discharge: HOME | End: 2018-06-06
Attending: EMERGENCY MEDICINE | Admitting: EMERGENCY MEDICINE
Payer: SELF-PAY

## 2018-06-06 ENCOUNTER — APPOINTMENT (OUTPATIENT)
Dept: GENERAL RADIOLOGY | Age: 23
End: 2018-06-06
Attending: PHYSICIAN ASSISTANT
Payer: SELF-PAY

## 2018-06-06 VITALS
DIASTOLIC BLOOD PRESSURE: 80 MMHG | SYSTOLIC BLOOD PRESSURE: 119 MMHG | WEIGHT: 190 LBS | HEIGHT: 72 IN | BODY MASS INDEX: 25.73 KG/M2 | TEMPERATURE: 98 F | HEART RATE: 71 BPM | OXYGEN SATURATION: 99 % | RESPIRATION RATE: 16 BRPM

## 2018-06-06 DIAGNOSIS — S63.501A SPRAIN OF RIGHT WRIST, INITIAL ENCOUNTER: Primary | ICD-10-CM

## 2018-06-06 PROCEDURE — 99282 EMERGENCY DEPT VISIT SF MDM: CPT

## 2018-06-06 PROCEDURE — 73110 X-RAY EXAM OF WRIST: CPT

## 2018-06-06 RX ORDER — NAPROXEN 500 MG/1
500 TABLET ORAL 2 TIMES DAILY WITH MEALS
Qty: 20 TAB | Refills: 0 | Status: SHIPPED | OUTPATIENT
Start: 2018-06-06 | End: 2019-06-03

## 2018-06-06 NOTE — LETTER
[facility] EMERGENCY DEPT 
[address] [address] [phone] Work/School Note Date: 6/6/2018 To Whom It May concern: 
 
Elena Haq was seen and treated today in the emergency room by the following provider(s): 
Attending Provider: Elisha Kelly MD 
Physician Assistant: JASON Saldivar.   
 
Elena Haq - pt should not be lifting more than 10 lbs until 6/13/2018. Sincerely, JASON Saldivar

## 2018-06-06 NOTE — DISCHARGE INSTRUCTIONS
Learning About RICE (Rest, Ice, Compression, and Elevation)  What is RICE? RICE is a way to care for an injury. RICE helps relieve pain and swelling. It may also help with healing and flexibility. RICE stands for:  · Rest and protect the injured or sore area. · Ice or a cold pack used as soon as possible. · Compression, or wrapping the injured or sore area with an elastic bandage. · Elevation (propping up) the injured or sore area. How do you do RICE? You can use RICE for home treatment when you have general aches and pains or after an injury or surgery. Rest  · Do not put weight on the injury for at least 24 to 48 hours. · Use crutches for a badly sprained knee or ankle. · Support a sprained wrist, elbow, or shoulder with a sling. Ice  · Put ice or a cold pack on the injury right away to reduce pain and swelling. Frozen vegetables will also work as an ice pack. Put a thin cloth between the ice or cold pack and your skin. The cloth protects the injured area from getting too cold. · Use ice for 10 to 15 minutes at a time for the first 48 to 72 hours. Compression  · Use compression for sprains, strains, and surgeries of the arms and legs. · Wrap the injured area with an elastic bandage or compression sleeve to reduce swelling. · Don't wrap it too tightly. If the area below it feels numb, tingles, or feels cool, loosen the wrap. Elevation  · Use elevation for areas of the body that can be propped up, such as arms and legs. · Prop up the injured area on pillows whenever you use ice. Keep it propped up anytime you sit or lie down. · Try to keep the injured area at or above the level of your heart. This will help reduce swelling and bruising. Where can you learn more? Go to http://jonn-manuel.info/. Enter I747 in the search box to learn more about \"Learning About RICE (Rest, Ice, Compression, and Elevation). \"  Current as of: March 21, 2017  Content Version: 11.4  © 4825-8956 [employer]. Care instructions adapted under license by [employer] (which disclaims liability or warranty for this information). If you have questions about a medical condition or this instruction, always ask your healthcare professional. [address] any warranty or liability for your use of this information. Wrist Sprain: Care Instructions  Your Care Instructions    Your wrist hurts because you have stretched or torn ligaments, which connect the bones in your wrist.  Wrist sprains usually take from 2 to 10 weeks to heal, but some take longer. Usually, the more pain you have, the more severe your wrist sprain is and the longer it will take to heal. You can heal faster and regain strength in your wrist with good home treatment. Follow-up care is a key part of your treatment and safety. Be sure to make and go to all appointments, and call your doctor if you are having problems. It's also a good idea to know your test results and keep a list of the medicines you take. How can you care for yourself at home? · Prop up your arm on a pillow when you ice it or anytime you sit or lie down for the next 3 days. Try to keep your wrist above the level of your heart. This will help reduce swelling. · Put ice or cold packs on your wrist for 10 to 20 minutes at a time. Try to do this every 1 to 2 hours for the next 3 days (when you are awake) or until the swelling goes down. Put a thin cloth between the ice pack and your skin. · After 2 or 3 days, if your swelling is gone, apply a heating pad set on low or a warm cloth to your wrist. This helps keep your wrist flexible. Some doctors suggest that you go back and forth between hot and cold. · If you have an elastic bandage, keep it on for the next 24 to 36 hours. The bandage should be snug but not so tight that it causes numbness or tingling.  To rewrap the wrist, wrap the bandage around the hand a few times, beginning at the fingers. Then wrap it around the hand between the thumb and index finger, ending by circling the wrist several times. · If your doctor gave you a splint or brace, wear it as directed to protect your wrist until it has healed. · Take pain medicines exactly as directed. ¨ If the doctor gave you a prescription medicine for pain, take it as prescribed. ¨ If you are not taking a prescription pain medicine, ask your doctor if you can take an over-the-counter medicine. · Try not to use your injured wrist and hand. When should you call for help? Call your doctor now or seek immediate medical care if:  ? · Your hand or fingers are cool or pale or change color. ? Watch closely for changes in your health, and be sure to contact your doctor if:  ? · Your pain gets worse. ? · Your wrist has not improved after 1 week. Where can you learn more? Go to http://jonn-manuel.info/. Enter G541 in the search box to learn more about \"Wrist Sprain: Care Instructions. \"  Current as of: March 21, 2017  Content Version: 11.4  © 8303-9223 [employer]. Care instructions adapted under license by [employer] (which disclaims liability or warranty for this information). If you have questions about a medical condition or this instruction, always ask your healthcare professional. [place] [address] any warranty or liability for your use of this information.

## 2018-06-06 NOTE — LETTER
[facility] EMERGENCY DEPT 
[address] [facility] [address] [facility] [address] [id number] [phone] Work/School Note Date: 6/6/2018 To Whom It May concern: 
 
Jenna Primrose was seen and treated today in the emergency room by the following provider(s): 
Attending Provider: Linda Montaño MD 
Physician Assistant: JASON Orozco.   
 
Jenna Primrose may return to work on 6/7/2018. Sincerely, JASON Orozco

## 2018-06-07 NOTE — ED PROVIDER NOTES
EMERGENCY DEPARTMENT HISTORY AND PHYSICAL EXAM      Date: 6/6/2018  Patient Name: Zafar Arias    History of Presenting Illness     Chief Complaint   Patient presents with    Wrist Pain     right after falling last night       History Provided By: Patient    HPI: Zafar Arias, 25 y.o. male with PMHx significant for migraines, seasonal allergies presents ambulatory to the ED with cc of right wrist pain after falling last night. Denies hitting head and LOC. Reports pain and swelling. Rates pain 7/10. Denies numbness/tingling. Has not taken anything for sx. Chief Complaint: wrist pain  Duration: 1 Days  Timing:  Constant  Location: right wrist  Quality: Aching  Severity: 7 out of 10  Modifying Factors: worse with movment  Associated Symptoms: denies any other associated signs or symptoms    There are no other complaints, changes, or physical findings at this time. PCP: Manuel Henao MD    Current Outpatient Prescriptions   Medication Sig Dispense Refill    naproxen (NAPROSYN) 500 mg tablet Take 1 Tab by mouth two (2) times daily (with meals). 20 Tab 0    SUMAtriptan (IMITREX) 50 mg tablet Take 50 mg by mouth once as needed for Migraine. Past History     Past Medical History:  Past Medical History:   Diagnosis Date    H/O seasonal allergies     Headache(784.0)     Migraines        Past Surgical History:  History reviewed. No pertinent surgical history. Family History:  History reviewed. No pertinent family history. Social History:  Social History   Substance Use Topics    Smoking status: Never Smoker    Smokeless tobacco: Never Used    Alcohol use No       Allergies:  No Known Allergies      Review of Systems   Review of Systems   Constitutional: Negative for chills and fever. HENT: Negative for facial swelling. Eyes: Negative for photophobia and visual disturbance. Respiratory: Negative for shortness of breath. Cardiovascular: Negative for chest pain. Gastrointestinal: Negative for abdominal pain, nausea and vomiting. Genitourinary: Negative for flank pain. Musculoskeletal: Positive for arthralgias. Negative for back pain, gait problem, joint swelling, myalgias and neck pain. Right wrist pain   Skin: Negative for color change, pallor, rash and wound. Neurological: Negative for dizziness, weakness, light-headedness and headaches. All other systems reviewed and are negative. Physical Exam   Physical Exam   Constitutional: He is oriented to person, place, and time. He appears well-developed and well-nourished. No distress. HENT:   Head: Normocephalic and atraumatic. Eyes: Conjunctivae are normal.   Cardiovascular: Normal rate, regular rhythm and normal heart sounds. Pulmonary/Chest: Effort normal and breath sounds normal. No respiratory distress. Abdominal: Soft. Bowel sounds are normal. There is no tenderness. Musculoskeletal:        Right elbow: Normal.He exhibits normal range of motion. No tenderness found. Right wrist: He exhibits tenderness and bony tenderness. He exhibits normal range of motion, no swelling, no effusion and no crepitus. Right forearm: Normal.        Right hand: Normal. He exhibits normal range of motion, no tenderness (no scaphoid tenderness) and no swelling. Normal sensation noted. Normal strength noted. Neurological: He is alert and oriented to person, place, and time. No cranial nerve deficit. Skin: Skin is warm. No rash noted. Psychiatric: He has a normal mood and affect. His behavior is normal.   Nursing note and vitals reviewed. Diagnostic Study Results     Labs -   No results found for this or any previous visit (from the past 12 hour(s)). Radiologic Studies -   XR WRIST RT AP/LAT/OBL MIN 3V   Final Result        CT Results  (Last 48 hours)    None        CXR Results  (Last 48 hours)    None            Medical Decision Making   I am the first provider for this patient.     I reviewed the vital signs, available nursing notes, past medical history, past surgical history, family history and social history. Vital Signs-Reviewed the patient's vital signs. Patient Vitals for the past 12 hrs:   Temp Pulse Resp BP SpO2   06/06/18 1533 98 °F (36.7 °C) 71 16 119/80 99 %       Records Reviewed: Nursing Notes and Old Medical Records    Provider Notes (Medical Decision Making):   DDx: wrist sprain vs fracture vs contusion    ED Course:   Initial assessment performed. The patients presenting problems have been discussed, and they are in agreement with the care plan formulated and outlined with them. I have encouraged them to ask questions as they arise throughout their visit. Disposition:  Discussed imaging results with pt along with dx and treatment plan. Ace wrap applied Discussed importance of PCP follow up. All questions answered. Pt voiced they understood. Return if sx worsen. PLAN:  1. Discharge Medication List as of 6/6/2018  4:26 PM      START taking these medications    Details   naproxen (NAPROSYN) 500 mg tablet Take 1 Tab by mouth two (2) times daily (with meals). , Normal, Disp-20 Tab, R-0         CONTINUE these medications which have NOT CHANGED    Details   SUMAtriptan (IMITREX) 50 mg tablet Take 50 mg by mouth once as needed for Migraine., Historical Med           2. Follow-up Information     Follow up With Details Comments [address], [place] Schedule an appointment as soon as possible for a visit As needed [address] [place] [id number] [phone]          Return to ED if worse     Diagnosis     Clinical Impression:   1.  Sprain of right wrist, initial encounter

## 2019-06-03 ENCOUNTER — APPOINTMENT (OUTPATIENT)
Dept: GENERAL RADIOLOGY | Age: 24
End: 2019-06-03
Attending: PHYSICIAN ASSISTANT
Payer: MEDICAID

## 2019-06-03 ENCOUNTER — HOSPITAL ENCOUNTER (EMERGENCY)
Age: 24
Discharge: HOME | End: 2019-06-03
Attending: EMERGENCY MEDICINE
Payer: MEDICAID

## 2019-06-03 VITALS
WEIGHT: 160 LBS | HEIGHT: 72 IN | TEMPERATURE: 98.6 F | HEART RATE: 98 BPM | RESPIRATION RATE: 18 BRPM | BODY MASS INDEX: 21.67 KG/M2 | OXYGEN SATURATION: 100 % | DIASTOLIC BLOOD PRESSURE: 83 MMHG | SYSTOLIC BLOOD PRESSURE: 142 MMHG
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
## 2019-06-03 DIAGNOSIS — S60.221A CONTUSION OF RIGHT HAND, INITIAL ENCOUNTER: ICD-10-CM
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
## 2019-06-03 PROCEDURE — 99283 EMERGENCY DEPT VISIT LOW MDM: CPT

## 2019-06-03 PROCEDURE — 73130 X-RAY EXAM OF HAND: CPT

## 2019-06-03 PROCEDURE — 74011250637 HC RX REV CODE- 250/637: Performed by: PHYSICIAN ASSISTANT

## 2019-06-03 RX ORDER — AMOXICILLIN AND CLAVULANATE POTASSIUM 875; 125 MG/1; MG/1
1 TABLET, FILM COATED ORAL 2 TIMES DAILY
Qty: 14 TAB | Refills: 0 | Status: SHIPPED | OUTPATIENT
Start: 2019-06-03 | End: 2019-06-10

## 2019-06-03 RX ORDER — NAPROXEN 250 MG/1
500 TABLET ORAL
Status: COMPLETED | OUTPATIENT
Start: 2019-06-03 | End: 2019-06-03

## 2019-06-03 RX ORDER — NAPROXEN 500 MG/1
500 TABLET ORAL 2 TIMES DAILY WITH MEALS
Qty: 20 TAB | Refills: 0 | Status: SHIPPED | OUTPATIENT
Start: 2019-06-03 | End: 2020-09-03

## 2019-06-03 RX ADMIN — NAPROXEN 500 MG: 250 TABLET ORAL at 22:21

## 2019-06-03 NOTE — LETTER
[facility] EMERGENCY DEPT 
[address] [facility] [address] [facility] [address] [address] 
[phone] Work/School Note Date: 6/3/2019 To Whom It May concern: 
 
Shilpa Hanson was seen and treated today in the emergency room by the following provider(s): 
Attending Provider: Call, Willodean Fleischer, MD 
Physician Assistant: JASON Carranza.   
 
Shilpa Hanson may return to work on 6/4/2019. Sincerely, JASON Yeh

## 2019-06-03 NOTE — LETTER
[facility] EMERGENCY DEPT 
[address] [address] 
[phone] Work/School Note Date: 6/3/2019 To Whom It May concern: 
 
Neal Mathews was seen and treated today in the emergency room by the following provider(s): 
Attending Provider: Everette Vera MD 
Physician Assistant: JASON Whipple.   
 
Neal Mathews may return to work on 6/5/2019. Sincerely, JASON Escamilla

## 2019-06-04 NOTE — DISCHARGE INSTRUCTIONS
Patient Education        Human Bites: Care Instructions  Your Care Instructions  The biggest danger from a human bite is that it might get infected. Usually the wound will not be stitched. Taking good care of your wound at home will help it heal and reduce your chance of infection. Your doctor may give you antibiotics to prevent infection and a tetanus shot if you have not had one in the last 5 years or do not know when you had your last one. Your wound may heal in less than a week, or it may take longer, depending on how bad it is. The larger it is, the longer it will take to heal.  The doctor has checked you carefully, but problems can develop later. If you notice any problems or new symptoms, get medical treatment right away. Follow-up care is a key part of your treatment and safety. Be sure to make and go to all appointments, and call your doctor if you are having problems. It's also a good idea to know your test results and keep a list of the medicines you take. How can you care for yourself at home? · If your doctor told you how to care for your wound, follow your doctor's instructions. If you did not get instructions, follow this general advice:  ? Wash the wound with clean water 2 times a day. Don't use hydrogen peroxide or alcohol, which can slow healing. ? You may cover the wound with a thin layer of petroleum jelly, such as Vaseline, and a nonstick bandage. ? Apply more petroleum jelly and replace the bandage as needed. · Your wound may itch or feel irritated. A little redness and swelling are normal. Do not scratch or rub the wound. · If your doctor prescribed antibiotics, take them as directed. Do not stop taking them just because you feel better. You need to take the full course of antibiotics. · Ask your doctor if you can take an over-the-counter pain medicine. When should you call for help?   Call your doctor now or seek immediate medical care if:    · The skin near the bite turns cold or pale or it changes color.     · You lose feeling in the area near the bite, or it feels numb or tingly.     · You have trouble moving a limb near the bite.     · You have symptoms of infection, such as:  ? Increased pain, swelling, warmth, or redness near the wound. ? Red streaks leading from the wound. ? Pus draining from the wound. ? A fever.     · Blood soaks through the bandage. Oozing small amounts of blood is normal.     · Your pain is getting worse.    Watch closely for changes in your health, and be sure to contact your doctor if you are not getting better as expected. Where can you learn more? Go to http://jonn-manuel.info/. Enter C720 in the search box to learn more about \"Human Bites: Care Instructions. \"  Current as of: September 23, 2018  Content Version: 11.9  © 0949-8777 [employer]. Care instructions adapted under license by [employer] (which disclaims liability or warranty for this information). If you have questions about a medical condition or this instruction, always ask your healthcare professional. [place] [address] any warranty or liability for your use of this information. Patient Education        Hand Bruises: Care Instructions  Your Care Instructions  Bruises, or contusions, can happen as a result of an impact or fall. Most people think of a bruise as a black-and-blue spot. This happens when small blood vessels get torn and leak blood under the skin. The bruise may turn purplish black, reddish blue, or yellowish green as it heals. But bones and muscles can also get bruised. This may damage the hand but not cause a bruise that you can see. Most bruises aren't serious and will go away on their own in 2 to 4 weeks. But sometimes a more serious hand injury might not heal on its own. Tell your doctor if you have new symptoms or your injury is not getting better over time.  You may have tests to see if you have bone or nerve damage. These tests may include X-rays, a CT scan, or an MRI. If you damaged bones or muscles, you may need more treatment. The doctor has checked you carefully, but problems can develop later. If you notice any problems or new symptoms, get medical treatment right away. Follow-up care is a key part of your treatment and safety. Be sure to make and go to all appointments, and call your doctor if you are having problems. It's also a good idea to know your test results and keep a list of the medicines you take. How can you care for yourself at home? · Put ice or a cold pack on the hand for 10 to 20 minutes at a time. Put a thin cloth between the ice and your skin. · Prop up your hand on a pillow when you ice it or anytime you sit or lie down during the next 3 days. Try to keep your hand above the level of your heart. This will help reduce swelling. · Be safe with medicines. Read and follow all instructions on the label. ? If the doctor gave you a prescription medicine for pain, take it as prescribed. ? If you are not taking a prescription pain medicine, ask your doctor if you can take an over-the-counter medicine. · Be sure to follow your doctor's advice about moving and exercising your injured hand. When should you call for help? Call your doctor now or seek immediate medical care if:    · Your pain gets worse.     · You have new or worse swelling.     · You have tingling, weakness, or numbness in the area near the bruise.     · The area near the bruise is cold or pale.     · You have symptoms of infection, such as:  ? Increased pain, swelling, warmth, or redness. ? Red streaks leading from the area. ? Pus draining from the area. ? A fever.    Watch closely for changes in your health, and be sure to contact your doctor if:    · You do not get better as expected. Where can you learn more? Go to http://jonn-manuel.info/.   Enter P364 in the search box to learn more about \"Hand Bruises: Care Instructions. \"  Current as of: September 23, 2018  Content Version: 11.9  © 4736-0031 [employer]. Care instructions adapted under license by [employer] (which disclaims liability or warranty for this information). If you have questions about a medical condition or this instruction, always ask your healthcare professional. [place] [address] any warranty or liability for your use of this information.
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
## 2019-06-04 NOTE — ED NOTES
JASON Rodríguez at bedside reviewing patient's discharge instructions and reviewing medications. Patient ambulatory adry. Patient in no apparent distress.

## 2020-09-03 ENCOUNTER — APPOINTMENT (OUTPATIENT)
Dept: CT IMAGING | Age: 25
End: 2020-09-03
Attending: PHYSICIAN ASSISTANT
Payer: COMMERCIAL

## 2020-09-03 ENCOUNTER — HOSPITAL ENCOUNTER (EMERGENCY)
Age: 25
Discharge: HOME | End: 2020-09-03
Attending: EMERGENCY MEDICINE
Payer: COMMERCIAL

## 2020-09-03 VITALS
RESPIRATION RATE: 16 BRPM | SYSTOLIC BLOOD PRESSURE: 127 MMHG | HEART RATE: 103 BPM | OXYGEN SATURATION: 96 % | BODY MASS INDEX: 25.98 KG/M2 | HEIGHT: 72 IN | DIASTOLIC BLOOD PRESSURE: 63 MMHG | TEMPERATURE: 98.3 F | WEIGHT: 191.8 LBS

## 2020-09-03 DIAGNOSIS — R50.9 ACUTE FEBRILE ILLNESS: Primary | ICD-10-CM

## 2020-09-03 DIAGNOSIS — Z20.822 SUSPECTED COVID-19 VIRUS INFECTION: ICD-10-CM

## 2020-09-03 LAB
ALBUMIN SERPL-MCNC: 4 G/DL (ref 3.5–5)
ALBUMIN/GLOB SERPL: 0.9 {RATIO} (ref 1.1–2.2)
ALP SERPL-CCNC: 49 U/L (ref 45–117)
ALT SERPL-CCNC: 85 U/L (ref 12–78)
ANION GAP SERPL CALC-SCNC: 2 MMOL/L (ref 5–15)
APPEARANCE UR: CLEAR
AST SERPL-CCNC: 77 U/L (ref 15–37)
BACTERIA URNS QL MICRO: NEGATIVE /HPF
BASOPHILS # BLD: 0 K/UL (ref 0–0.1)
BASOPHILS NFR BLD: 0 % (ref 0–1)
BILIRUB SERPL-MCNC: 1.4 MG/DL (ref 0.2–1)
BILIRUB UR QL: NEGATIVE
BUN SERPL-MCNC: 10 MG/DL (ref 6–20)
BUN/CREAT SERPL: 7 (ref 12–20)
CALCIUM SERPL-MCNC: 9.3 MG/DL (ref 8.5–10.1)
CHLORIDE SERPL-SCNC: 103 MMOL/L (ref 97–108)
CO2 SERPL-SCNC: 30 MMOL/L (ref 21–32)
COLOR UR: ABNORMAL
CREAT SERPL-MCNC: 1.35 MG/DL (ref 0.7–1.3)
DEPRECATED S PYO AG THROAT QL EIA: NEGATIVE
DIFFERENTIAL METHOD BLD: ABNORMAL
EOSINOPHIL # BLD: 0 K/UL (ref 0–0.4)
EOSINOPHIL NFR BLD: 0 % (ref 0–7)
EPITH CASTS URNS QL MICRO: ABNORMAL /LPF
ERYTHROCYTE [DISTWIDTH] IN BLOOD BY AUTOMATED COUNT: 11.8 % (ref 11.5–14.5)
GLOBULIN SER CALC-MCNC: 4.4 G/DL (ref 2–4)
GLUCOSE SERPL-MCNC: 99 MG/DL (ref 65–100)
GLUCOSE UR STRIP.AUTO-MCNC: NEGATIVE MG/DL
HCT VFR BLD AUTO: 46.8 % (ref 36.6–50.3)
HETEROPH AB BLD QL IA: NEGATIVE
HGB BLD-MCNC: 14.7 G/DL (ref 12.1–17)
HGB UR QL STRIP: NEGATIVE
IMM GRANULOCYTES # BLD AUTO: 0 K/UL (ref 0–0.04)
IMM GRANULOCYTES NFR BLD AUTO: 0 % (ref 0–0.5)
KETONES UR QL STRIP.AUTO: ABNORMAL MG/DL
LEUKOCYTE ESTERASE UR QL STRIP.AUTO: NEGATIVE
LYMPHOCYTES # BLD: 1 K/UL (ref 0.8–3.5)
LYMPHOCYTES NFR BLD: 8 % (ref 12–49)
MCH RBC QN AUTO: 28 PG (ref 26–34)
MCHC RBC AUTO-ENTMCNC: 31.4 G/DL (ref 30–36.5)
MCV RBC AUTO: 89.1 FL (ref 80–99)
MONOCYTES # BLD: 1.2 K/UL (ref 0–1)
MONOCYTES NFR BLD: 10 % (ref 5–13)
NEUTS SEG # BLD: 9.9 K/UL (ref 1.8–8)
NEUTS SEG NFR BLD: 82 % (ref 32–75)
NITRITE UR QL STRIP.AUTO: NEGATIVE
NRBC # BLD: 0 K/UL (ref 0–0.01)
NRBC BLD-RTO: 0 PER 100 WBC
PH UR STRIP: 6 [PH] (ref 5–8)
PLATELET # BLD AUTO: 213 K/UL (ref 150–400)
PMV BLD AUTO: 9.5 FL (ref 8.9–12.9)
POTASSIUM SERPL-SCNC: 4.6 MMOL/L (ref 3.5–5.1)
PROT SERPL-MCNC: 8.4 G/DL (ref 6.4–8.2)
PROT UR STRIP-MCNC: ABNORMAL MG/DL
RBC # BLD AUTO: 5.25 M/UL (ref 4.1–5.7)
RBC #/AREA URNS HPF: ABNORMAL /HPF (ref 0–5)
SODIUM SERPL-SCNC: 135 MMOL/L (ref 136–145)
SP GR UR REFRACTOMETRY: 1.03 (ref 1–1.03)
UA: UC IF INDICATED,UAUC: ABNORMAL
UROBILINOGEN UR QL STRIP.AUTO: 1 EU/DL (ref 0.2–1)
WBC # BLD AUTO: 12.2 K/UL (ref 4.1–11.1)
WBC URNS QL MICRO: ABNORMAL /HPF (ref 0–4)

## 2020-09-03 PROCEDURE — 81001 URINALYSIS AUTO W/SCOPE: CPT

## 2020-09-03 PROCEDURE — 99284 EMERGENCY DEPT VISIT MOD MDM: CPT

## 2020-09-03 PROCEDURE — 87070 CULTURE OTHR SPECIMN AEROBIC: CPT

## 2020-09-03 PROCEDURE — 85025 COMPLETE CBC W/AUTO DIFF WBC: CPT

## 2020-09-03 PROCEDURE — 36415 COLL VENOUS BLD VENIPUNCTURE: CPT

## 2020-09-03 PROCEDURE — 80053 COMPREHEN METABOLIC PANEL: CPT

## 2020-09-03 PROCEDURE — 87635 SARS-COV-2 COVID-19 AMP PRB: CPT

## 2020-09-03 PROCEDURE — 87880 STREP A ASSAY W/OPTIC: CPT

## 2020-09-03 PROCEDURE — 74011250636 HC RX REV CODE- 250/636: Performed by: PHYSICIAN ASSISTANT

## 2020-09-03 PROCEDURE — 74011000636 HC RX REV CODE- 636: Performed by: EMERGENCY MEDICINE

## 2020-09-03 PROCEDURE — 74011250637 HC RX REV CODE- 250/637: Performed by: PHYSICIAN ASSISTANT

## 2020-09-03 PROCEDURE — 74177 CT ABD & PELVIS W/CONTRAST: CPT

## 2020-09-03 PROCEDURE — 86308 HETEROPHILE ANTIBODY SCREEN: CPT

## 2020-09-03 RX ORDER — ACETAMINOPHEN 325 MG/1
650 TABLET ORAL
Qty: 20 TAB | Refills: 0 | Status: SHIPPED | OUTPATIENT
Start: 2020-09-03

## 2020-09-03 RX ORDER — ASCORBIC ACID 500 MG
500 TABLET ORAL 2 TIMES DAILY
Qty: 20 TAB | Refills: 0 | Status: SHIPPED | OUTPATIENT
Start: 2020-09-03 | End: 2020-09-13

## 2020-09-03 RX ORDER — IBUPROFEN 800 MG/1
800 TABLET ORAL
Qty: 20 TAB | Refills: 0 | Status: SHIPPED | OUTPATIENT
Start: 2020-09-03 | End: 2020-09-10

## 2020-09-03 RX ORDER — ACETAMINOPHEN 500 MG
1000 TABLET ORAL
Status: COMPLETED | OUTPATIENT
Start: 2020-09-03 | End: 2020-09-03

## 2020-09-03 RX ORDER — SODIUM CHLORIDE 0.9 % (FLUSH) 0.9 %
10 SYRINGE (ML) INJECTION
Status: COMPLETED | OUTPATIENT
Start: 2020-09-03 | End: 2020-09-03

## 2020-09-03 RX ADMIN — SODIUM CHLORIDE 1000 ML: 9 INJECTION, SOLUTION INTRAVENOUS at 19:27

## 2020-09-03 RX ADMIN — IOPAMIDOL 100 ML: 755 INJECTION, SOLUTION INTRAVENOUS at 20:27

## 2020-09-03 RX ADMIN — Medication 10 ML: at 20:28

## 2020-09-03 RX ADMIN — ACETAMINOPHEN 1000 MG: 500 TABLET ORAL at 19:33

## 2020-09-03 NOTE — LETTER
[address] 
\[facility]\"" EMERGENCY DEPT 
[address] [place] [id number] 
[phone] Work/School Note Date: 9/3/2020 To Whom It May concern: 
 
Tammy Vee was seen and treated today in the emergency room by the following provider(s): 
Attending Provider: Dorothy Woods MD 
Physician Assistant: JASON Conti. In light of the current COVID-19 pandemic, please excuse your employee from work under the circumstance below: 
 
1) If patient was exposed but without symptoms, he/she should self-isolate at home for 14 days from day of exposure. 2) If patient has symptoms concerning for COVID-19, such as fever, cough, shortness of breath, regardless if patient received testing or not, patient should self-isolate at home until 3 days after symptoms have resolved AND 7 days after symptoms first started, whichever is later. Thank you. Sincerely, JASON Yee

## 2020-09-03 NOTE — ED NOTES
Bedside and Verbal shift change report [facility] by Judy Russell RN (offgoing nurse). Report included the following information SBAR, ED Summary, MAR and Recent Results.

## 2020-09-03 NOTE — ED PROVIDER NOTES
EMERGENCY DEPARTMENT HISTORY AND PHYSICAL EXAM      Date: 9/3/2020  Patient Name: Ana Harper    History of Presenting Illness     Chief Complaint   Patient presents with    Generalized Body Aches     Pt reports all over body pain, abdominal pain and diarrhea for past couple days. History Provided By: Patient    HPI: Ana Harper, 26 y.o. male  presents ambulatory with his mom to the ED with cc of 3 days of 8 out of 10 constant, aching sore throat, body aches headache and tummy pain for which he is taken no medication and for which he tells me he \"hurts all over\". He denies any known sick contacts. There has been no recent travel. There has been no chest pain or shortness of breath. He endorses a sore throat at the onset of his illness however he tells me that has improved. He tells me his appetite is normal.  There has been no nausea or vomiting however he does endorse an episode of loose stool this morning. There are no other complaints, changes, or physical findings at this time. PCP: None    Current Outpatient Medications   Medication Sig Dispense Refill    acetaminophen (TYLENOL) 325 mg tablet Take 2 Tabs by mouth every six (6) hours as needed for Pain or Fever. 20 Tab 0    zinc 50 mg tab tablet Take 1 Tab by mouth two (2) times a day. 20 Tab 0    ascorbic acid, vitamin C, (VITAMIN C) 500 mg tablet Take 1 Tab by mouth two (2) times a day for 10 days. 20 Tab 0    ibuprofen (MOTRIN) 800 mg tablet Take 1 Tab by mouth every eight (8) hours as needed for Pain (fever) for up to 7 days. 20 Tab 0     Past History     Past Medical History:  Past Medical History:   Diagnosis Date    H/O seasonal allergies     Headache(784.0)     Migraines        Past Surgical History:  No past surgical history on file. Family History:  No family history on file.     Social History:  Social History     Tobacco Use    Smoking status: Never Smoker    Smokeless tobacco: Never Used Substance Use Topics    Alcohol use: No    Drug use: No       Allergies:  No Known Allergies  Review of Systems   Review of Systems   Constitutional: Positive for fever. Negative for fatigue. HENT: Positive for sore throat. Negative for congestion, ear pain and rhinorrhea. Eyes: Negative for pain and redness. Respiratory: Negative for cough and wheezing. Cardiovascular: Negative for chest pain and palpitations. Gastrointestinal: Positive for abdominal pain and diarrhea. Negative for nausea and vomiting. Genitourinary: Negative for dysuria, frequency and urgency. Musculoskeletal: Positive for myalgias. Negative for back pain, neck pain and neck stiffness. Skin: Negative for rash and wound. Neurological: Negative for weakness, light-headedness, numbness and headaches. Physical Exam   Physical Exam  Vitals signs and nursing note reviewed. Constitutional:       General: He is not in acute distress. Appearance: He is well-developed. He is not toxic-appearing. HENT:      Head: Normocephalic and atraumatic. No right periorbital erythema or left periorbital erythema. Jaw: No trismus. Right Ear: Tympanic membrane, ear canal and external ear normal. Tympanic membrane is not erythematous. Left Ear: Tympanic membrane, ear canal and external ear normal. Tympanic membrane is not erythematous. Nose: Nose normal.      Mouth/Throat:      Mouth: Mucous membranes are moist.      Pharynx: Uvula midline. Tonsils: 1+ on the right. 1+ on the left. Comments:   No facial swelling  No trismus  1+ symmetric tonsils with mild erythema  No exudate  Eyes:      General: No scleral icterus. Conjunctiva/sclera: Conjunctivae normal.      Pupils: Pupils are equal, round, and reactive to light. Neck:      Musculoskeletal: Full passive range of motion without pain and normal range of motion.       Comments:   Supple; no meningismus  Cardiovascular:      Rate and Rhythm: Normal rate and regular rhythm. Heart sounds: Normal heart sounds. Comments:   Heart rate is 100 on my exam  Pulmonary:      Effort: Pulmonary effort is normal. No tachypnea, accessory muscle usage or respiratory distress. Breath sounds: Normal breath sounds. No decreased breath sounds or wheezing. Comments:   Breathing is unlabored and lungs are clear to auscultation  Abdominal:      Palpations: Abdomen is soft. Abdomen is not rigid. Tenderness: There is generalized abdominal tenderness. There is no guarding. Comments:   Abdomen is flat and soft  There is mild, diffuse abdominal tenderness with palpation   Musculoskeletal: Normal range of motion. Skin:     Findings: No rash. Neurological:      Mental Status: He is alert and oriented to person, place, and time. He is not disoriented. GCS: GCS eye subscore is 4. GCS verbal subscore is 5. GCS motor subscore is 6. Cranial Nerves: No cranial nerve deficit. Sensory: No sensory deficit. Psychiatric:         Speech: Speech normal.       Diagnostic Study Results     Labs -     Recent Results (from the past 12 hour(s))   CBC WITH AUTOMATED DIFF    Collection Time: 09/03/20  5:33 PM   Result Value Ref Range    WBC 12.2 (H) 4.1 - 11.1 K/uL    RBC 5.25 4. 10 - 5.70 M/uL    HGB 14.7 12.1 - 17.0 g/dL    HCT 46.8 36.6 - 50.3 %    MCV 89.1 80.0 - 99.0 FL    MCH 28.0 26.0 - 34.0 PG    MCHC 31.4 30.0 - 36.5 g/dL    RDW 11.8 11.5 - 14.5 %    PLATELET 543 436 - 218 K/uL    MPV 9.5 8.9 - 12.9 FL    NRBC 0.0 0  WBC    ABSOLUTE NRBC 0.00 0.00 - 0.01 K/uL    NEUTROPHILS 82 (H) 32 - 75 %    LYMPHOCYTES 8 (L) 12 - 49 %    MONOCYTES 10 5 - 13 %    EOSINOPHILS 0 0 - 7 %    BASOPHILS 0 0 - 1 %    IMMATURE GRANULOCYTES 0 0.0 - 0.5 %    ABS. NEUTROPHILS 9.9 (H) 1.8 - 8.0 K/UL    ABS. LYMPHOCYTES 1.0 0.8 - 3.5 K/UL    ABS. MONOCYTES 1.2 (H) 0.0 - 1.0 K/UL    ABS. EOSINOPHILS 0.0 0.0 - 0.4 K/UL    ABS. BASOPHILS 0.0 0.0 - 0.1 K/UL    ABS. IMM.  GRANS. 0.0 0.00 - 0.04 K/UL    DF AUTOMATED     METABOLIC PANEL, COMPREHENSIVE    Collection Time: 09/03/20  5:33 PM   Result Value Ref Range    Sodium 135 (L) 136 - 145 mmol/L    Potassium 4.6 3.5 - 5.1 mmol/L    Chloride 103 97 - 108 mmol/L    CO2 30 21 - 32 mmol/L    Anion gap 2 (L) 5 - 15 mmol/L    Glucose 99 65 - 100 mg/dL    BUN 10 6 - 20 MG/DL    Creatinine 1.35 (H) 0.70 - 1.30 MG/DL    BUN/Creatinine ratio 7 (L) 12 - 20      GFR est AA >60 >60 ml/min/1.73m2    GFR est non-AA >60 >60 ml/min/1.73m2    Calcium 9.3 8.5 - 10.1 MG/DL    Bilirubin, total 1.4 (H) 0.2 - 1.0 MG/DL    ALT (SGPT) 85 (H) 12 - 78 U/L    AST (SGOT) 77 (H) 15 - 37 U/L    Alk.  phosphatase 49 45 - 117 U/L    Protein, total 8.4 (H) 6.4 - 8.2 g/dL    Albumin 4.0 3.5 - 5.0 g/dL    Globulin 4.4 (H) 2.0 - 4.0 g/dL    A-G Ratio 0.9 (L) 1.1 - 2.2     MONONUCLEOSIS SCREEN    Collection Time: 09/03/20  5:33 PM   Result Value Ref Range    Mononucleosis screen Negative NEG     URINALYSIS W/ REFLEX CULTURE    Collection Time: 09/03/20  6:08 PM    Specimen: Urine   Result Value Ref Range    Color DARK YELLOW      Appearance CLEAR CLEAR      Specific gravity 1.029 1.003 - 1.030      pH (UA) 6.0 5.0 - 8.0      Protein TRACE (A) NEG mg/dL    Glucose Negative NEG mg/dL    Ketone TRACE (A) NEG mg/dL    Bilirubin Negative NEG      Blood Negative NEG      Urobilinogen 1.0 0.2 - 1.0 EU/dL    Nitrites Negative NEG      Leukocyte Esterase Negative NEG      WBC 0-4 0 - 4 /hpf    RBC 0-5 0 - 5 /hpf    Epithelial cells FEW FEW /lpf    Bacteria Negative NEG /hpf    UA:UC IF INDICATED CULTURE NOT INDICATED BY UA RESULT CNI     STREP AG SCREEN, GROUP A    Collection Time: 09/03/20  7:42 PM    Specimen: Serum   Result Value Ref Range    Group A Strep Ag ID Negative NEG     SARS-COV-2    Collection Time: 09/03/20  9:17 PM   Result Value Ref Range    Specimen source Nasopharyngeal      SARS-CoV-2 PENDING     SARS-CoV-2 PENDING     Specimen source Nasopharyngeal      COVID-19 rapid test PENDING     Specimen type NP Swab      Health status PENDING     COVID-19 PENDING        Radiologic Studies -   CT ABD PELV W CONT   Final Result   IMPRESSION:   No acute abnormality. CT Results  (Last 48 hours)               09/03/20 2028  CT ABD PELV W CONT Final result    Impression:  IMPRESSION:   No acute abnormality. Narrative:  EXAM: CT ABD PELV W CONT       INDICATION: Generalized abdominal pain       COMPARISON: None        CONTRAST: 100 mL of Isovue-370. TECHNIQUE:    Following the uneventful intravenous administration of contrast, thin axial   images were obtained through the abdomen and pelvis. Coronal and sagittal   reconstructions were generated. Oral contrast was not administered. CT dose   reduction was achieved through use of a standardized protocol tailored for this   examination and automatic exposure control for dose modulation. FINDINGS:    LOWER THORAX: No significant abnormality in the incidentally imaged lower chest.   LIVER: No mass. BILIARY TREE: Gallbladder is within normal limits. CBD is not dilated. SPLEEN: within normal limits. PANCREAS: No mass or ductal dilatation. ADRENALS: Unremarkable. KIDNEYS: No mass, calculus, or hydronephrosis. STOMACH: Unremarkable. SMALL BOWEL: No dilatation or wall thickening. COLON: No dilatation or wall thickening. APPENDIX: Unremarkable. PERITONEUM: No ascites or pneumoperitoneum. RETROPERITONEUM: No lymphadenopathy or aortic aneurysm. REPRODUCTIVE ORGANS: There is no pelvic mass or lymphadenopathy. URINARY BLADDER: No mass or calculus. BONES: No destructive bone lesion. ABDOMINAL WALL: No mass or hernia. ADDITIONAL COMMENTS: N/A               CXR Results  (Last 48 hours)    None        Medical Decision Making   I am the first provider for this patient.     I reviewed the vital signs, available nursing notes, past medical history, past surgical history, family history and social history. Vital Signs-Reviewed the patient's vital signs. Patient Vitals for the past 12 hrs:   Temp Pulse Resp BP SpO2   09/03/20 2046 98.3 °F (36.8 °C)       09/03/20 1902 (!) 101.3 °F (38.5 °C) (!) 103 16 127/63 96 %   09/03/20 1638 99.5 °F (37.5 °C) 95 17 134/84 98 %       Pulse Oximetry Analysis - 98% on RA    Records Reviewed: Nursing Notes, Old Medical Records, Previous Radiology Studies and Previous Laboratory Studies    Provider Notes (Medical Decision Making):   DDx: Streptococcal pharyngitis, infectious mononucleosis, pneumonia, COVID-19, hepatitis, pancreatitis, cholecystitis, appendicitis, diverticulitis, gastroenteritis, gastritis and others. Rapid strep and mono are negative; CT of the abdomen is negative for acute process; in spite of the patient's low-grade fever he denies cough or shortness of breath; he works at the [employer] and has many numbers of potential sick contacts; for this reason SARS-CoV-2 test is obtained; patient is instructed to continue quarantine, social distance, frequent handwashing, sanitize high contact surface areas and return for worsening shortness of breath or any other concerns    ED Course:   Initial assessment performed. The patients presenting problems have been discussed, and they are in agreement with the care plan formulated and outlined with them. I have encouraged them to ask questions as they arise throughout their visit. Disposition:  Discharge    PLAN:  1. Discharge Medication List as of 9/3/2020  9:56 PM      START taking these medications    Details   acetaminophen (TYLENOL) 325 mg tablet Take 2 Tabs by mouth every six (6) hours as needed for Pain or Fever., Normal, Disp-20 Tab,R-0      zinc 50 mg tab tablet Take 1 Tab by mouth two (2) times a day., Normal, Disp-20 Tab,R-0      ascorbic acid, vitamin C, (VITAMIN C) 500 mg tablet Take 1 Tab by mouth two (2) times a day for 10 days. , Normal, Disp-20 Tab,R-0      ibuprofen (MOTRIN) 800 mg tablet Take 1 Tab by mouth every eight (8) hours as needed for Pain (fever) for up to 7 days. , Normal, Disp-20 Tab,R-0           2. Follow-up Information     Follow up With Specialties Details Why Contact Info    [facility] EMERGENCY DEPT Emergency Medicine  If symptoms worsen [address] [place] [address]  [phone]        Return to ED if worse     Diagnosis     Clinical Impression:   1. Acute febrile illness    2.  Suspected COVID-19 virus infection

## 2020-09-04 LAB
COVID-19, XGCOVT: NOT DETECTED
HEALTH STATUS, XMCV2T: NORMAL
SOURCE, COVRS: NORMAL
SPECIMEN SOURCE, FCOV2M: NORMAL
SPECIMEN TYPE, XMCV1T: NORMAL

## 2020-09-04 NOTE — ED NOTES
Pt returned from CT and reported he vomited after exam.  Pt denies any nausea at this time. Pt provided new mask. Notified PA. 945 Pt taken to alternate room for COVID swap. 1034 Pt discharged by PA. Pt provided with discharge instructions Rx and instructions on follow up care. Pt out of ED ambulatory without difficulty accompanied by family.

## 2020-09-05 ENCOUNTER — HOSPITAL ENCOUNTER (EMERGENCY)
Age: 25
Discharge: HOME | End: 2020-09-05
Attending: EMERGENCY MEDICINE
Payer: COMMERCIAL

## 2020-09-05 VITALS
BODY MASS INDEX: 25.74 KG/M2 | WEIGHT: 190.04 LBS | HEART RATE: 65 BPM | HEIGHT: 72 IN | SYSTOLIC BLOOD PRESSURE: 129 MMHG | RESPIRATION RATE: 13 BRPM | DIASTOLIC BLOOD PRESSURE: 100 MMHG | TEMPERATURE: 98.4 F | OXYGEN SATURATION: 100 %

## 2020-09-05 DIAGNOSIS — R03.0 ELEVATED BLOOD PRESSURE READING: ICD-10-CM

## 2020-09-05 DIAGNOSIS — Z20.2 POSSIBLE EXPOSURE TO STD: Primary | ICD-10-CM

## 2020-09-05 LAB
APPEARANCE UR: CLEAR
BACTERIA SPEC CULT: NORMAL
BACTERIA URNS QL MICRO: NEGATIVE /HPF
BILIRUB UR QL: NEGATIVE
COLOR UR: NORMAL
EPITH CASTS URNS QL MICRO: NORMAL /LPF
GLUCOSE UR STRIP.AUTO-MCNC: NEGATIVE MG/DL
HGB UR QL STRIP: NEGATIVE
HYALINE CASTS URNS QL MICRO: NORMAL /LPF (ref 0–5)
KETONES UR QL STRIP.AUTO: NEGATIVE MG/DL
LEUKOCYTE ESTERASE UR QL STRIP.AUTO: NEGATIVE
NITRITE UR QL STRIP.AUTO: NEGATIVE
PH UR STRIP: 5.5 [PH] (ref 5–8)
PROT UR STRIP-MCNC: NEGATIVE MG/DL
RBC #/AREA URNS HPF: NORMAL /HPF (ref 0–5)
SERVICE CMNT-IMP: NORMAL
SP GR UR REFRACTOMETRY: 1.02 (ref 1–1.03)
UA: UC IF INDICATED,UAUC: NORMAL
UROBILINOGEN UR QL STRIP.AUTO: 0.2 EU/DL (ref 0.2–1)
WBC URNS QL MICRO: NORMAL /HPF (ref 0–4)

## 2020-09-05 PROCEDURE — 81001 URINALYSIS AUTO W/SCOPE: CPT

## 2020-09-05 PROCEDURE — 74011000250 HC RX REV CODE- 250: Performed by: PHYSICIAN ASSISTANT

## 2020-09-05 PROCEDURE — 74011250637 HC RX REV CODE- 250/637: Performed by: PHYSICIAN ASSISTANT

## 2020-09-05 PROCEDURE — 99283 EMERGENCY DEPT VISIT LOW MDM: CPT

## 2020-09-05 PROCEDURE — 87491 CHLMYD TRACH DNA AMP PROBE: CPT

## 2020-09-05 PROCEDURE — 74011250636 HC RX REV CODE- 250/636: Performed by: PHYSICIAN ASSISTANT

## 2020-09-05 PROCEDURE — 96372 THER/PROPH/DIAG INJ SC/IM: CPT

## 2020-09-05 RX ORDER — AZITHROMYCIN 250 MG/1
1000 TABLET, FILM COATED ORAL
Status: COMPLETED | OUTPATIENT
Start: 2020-09-05 | End: 2020-09-05

## 2020-09-05 RX ADMIN — LIDOCAINE HYDROCHLORIDE 250 MG: 10 INJECTION, SOLUTION EPIDURAL; INFILTRATION; INTRACAUDAL; PERINEURAL at 14:43

## 2020-09-05 RX ADMIN — AZITHROMYCIN 1000 MG: 250 TABLET, FILM COATED ORAL at 14:43

## 2020-09-05 NOTE — PROGRESS NOTES
Discussed negative results with patient. The patient was called for notification of a NEGATIVE test result for COVID-19. The following information was given to the patient:    The COVID-19 test, also known as novel coronavirus, result was negative  You probably were not infected at the time your sample was collected. However, that does not mean you will not get sick. The test result only means that you did not have COVID-19 at the time of testing. If you have no symptoms, continue to use preventive measures to protect yourself and others. If you have been sick, there are many other potential infectious causes. Contact your Primary Care Provider or Care Team for specific guidance, particularly if your symptoms worsen.   For more information visit the CDC website: DotProtection.gl

## 2020-09-05 NOTE — ED PROVIDER NOTES
EMERGENCY DEPARTMENT HISTORY AND PHYSICAL EXAM      Date: 9/5/2020  Patient Name: Rashaun Galeana    Please note that this dictation was completed with Pennant, the computer voice recognition software. Quite often unanticipated grammatical, syntax, homophones, and other interpretive errors are inadvertently transcribed by the computer software. Please disregard these errors. Please excuse any errors that have escaped final proofreading. History of Presenting Illness     Chief Complaint   Patient presents with    Exposure to STD     Ambulatory into the ED asking for an STD test - denies sx, but states his partner had some sx. History Provided By: Patient    HPI: Rashaun Galeana, 26 y.o. male with PMHx significant for headaches and seasonal allergies, presents ambulatory to the ED with cc of needing a STD test. He reports that his male partner is experiencing dysuria and believes that he has a UTI but the patient wants to be sure. His partner was tested yesterday and received empiric treatment but they do not yet know the results. Pt denies any testicular pain, swelling, dysuria, hematuria, discharge. PCP: None    There are no other complaints, changes, or physical findings at this time. Current Facility-Administered Medications   Medication Dose Route Frequency Provider Last Rate Last Dose    cefTRIAXone (ROCEPHIN) 250 mg in lidocaine (PF) (XYLOCAINE) 10 mg/mL (1 %) IM injection  250 mg IntraMUSCular ONCE Steve Ricks [place]        [facility]) tablet 1,000 mg  1,000 mg Oral NOW Steve Ricks [place]         Current Outpatient Medications   Medication Sig Dispense Refill    acetaminophen (TYLENOL) 325 mg tablet Take 2 Tabs by mouth every six (6) hours as needed for Pain or Fever. 20 Tab 0    zinc 50 mg tab tablet Take 1 Tab by mouth two (2) times a day.  20 Tab 0    ascorbic acid, vitamin C, (VITAMIN C) 500 mg tablet Take 1 Tab by mouth two (2) times a day for 10 days. 20 Tab 0    ibuprofen (MOTRIN) 800 mg tablet Take 1 Tab by mouth every eight (8) hours as needed for Pain (fever) for up to 7 days. 20 Tab 0       Past History     Past Medical History:  Past Medical History:   Diagnosis Date    H/O seasonal allergies     Headache(784.0)     Migraines        Past Surgical History:  History reviewed. No pertinent surgical history. Family History:  History reviewed. No pertinent family history. Social History:  Social History     Tobacco Use    Smoking status: Never Smoker    Smokeless tobacco: Never Used   Substance Use Topics    Alcohol use: No    Drug use: No       Allergies:  No Known Allergies      Review of Systems   Review of Systems   Constitutional: Negative. Negative for chills and fever. Eyes: Negative for pain and visual disturbance. Respiratory: Negative for cough and shortness of breath. Cardiovascular: Negative for chest pain and palpitations. Gastrointestinal: Negative for abdominal pain, nausea and vomiting. Genitourinary: Negative for discharge, dysuria, flank pain, hematuria, penile pain, penile swelling, scrotal swelling, testicular pain and urgency. Musculoskeletal: Negative for arthralgias and myalgias. Skin: Negative for color change, rash and wound. Neurological: Negative for numbness and headaches. All other systems reviewed and are negative. Physical Exam   Physical Exam  Vitals signs and nursing note reviewed. Constitutional:       General: He is not in acute distress. Appearance: He is well-developed. He is not diaphoretic. Comments: 26 y.o.  male in NAD  Communicates appropriately and in full sentences   HENT:      Head: Normocephalic and atraumatic. Eyes:      General:         Right eye: No discharge. Left eye: No discharge. Conjunctiva/sclera: Conjunctivae normal.      Pupils: Pupils are equal, round, and reactive to light.    Neck:      Musculoskeletal: Normal range of motion and neck supple. Comments: No nuchal rigidity or meningeal signs  Cardiovascular:      Rate and Rhythm: Normal rate and regular rhythm. Pulmonary:      Effort: Pulmonary effort is normal. No respiratory distress. Breath sounds: Normal breath sounds. Abdominal:      General: Abdomen is flat. Tenderness: There is no abdominal tenderness. There is no right CVA tenderness, left CVA tenderness or guarding. Genitourinary:     Comments: Defers  exam.   Musculoskeletal: Normal range of motion. General: No tenderness or deformity. Comments: No neurologic or vascular compromise on exam.    Skin:     General: Skin is warm and dry. Coloration: Skin is not pale. Findings: No erythema or rash. Neurological:      Mental Status: He is alert and oriented to person, place, and time. Psychiatric:         Behavior: Behavior normal.           Diagnostic Study Results     Labs -     Recent Results (from the past 12 hour(s))   URINALYSIS W/ REFLEX CULTURE    Collection Time: 09/05/20  1:46 PM    Specimen: Urine   Result Value Ref Range    Color YELLOW/STRAW      Appearance CLEAR CLEAR      Specific gravity 1.022 1.003 - 1.030      pH (UA) 5.5 5.0 - 8.0      Protein Negative NEG mg/dL    Glucose Negative NEG mg/dL    Ketone Negative NEG mg/dL    Bilirubin Negative NEG      Blood Negative NEG      Urobilinogen 0.2 0.2 - 1.0 EU/dL    Nitrites Negative NEG      Leukocyte Esterase Negative NEG      WBC 0-4 0 - 4 /hpf    RBC 0-5 0 - 5 /hpf    Epithelial cells FEW FEW /lpf    Bacteria Negative NEG /hpf    UA:UC IF INDICATED PENDING     Hyaline cast 0-2 0 - 5 /lpf       Radiologic Studies -   No orders to display     CT Results  (Last 48 hours)               09/03/20 2028  CT ABD PELV W CONT Final result    Impression:  IMPRESSION:   No acute abnormality.        Narrative:  EXAM: CT ABD PELV W CONT       INDICATION: Generalized abdominal pain       COMPARISON: None        CONTRAST: 100 mL of Isovue-370. TECHNIQUE:    Following the uneventful intravenous administration of contrast, thin axial   images were obtained through the abdomen and pelvis. Coronal and sagittal   reconstructions were generated. Oral contrast was not administered. CT dose   reduction was achieved through use of a standardized protocol tailored for this   examination and automatic exposure control for dose modulation. FINDINGS:    LOWER THORAX: No significant abnormality in the incidentally imaged lower chest.   LIVER: No mass. BILIARY TREE: Gallbladder is within normal limits. CBD is not dilated. SPLEEN: within normal limits. PANCREAS: No mass or ductal dilatation. ADRENALS: Unremarkable. KIDNEYS: No mass, calculus, or hydronephrosis. STOMACH: Unremarkable. SMALL BOWEL: No dilatation or wall thickening. COLON: No dilatation or wall thickening. APPENDIX: Unremarkable. PERITONEUM: No ascites or pneumoperitoneum. RETROPERITONEUM: No lymphadenopathy or aortic aneurysm. REPRODUCTIVE ORGANS: There is no pelvic mass or lymphadenopathy. URINARY BLADDER: No mass or calculus. BONES: No destructive bone lesion. ABDOMINAL WALL: No mass or hernia. ADDITIONAL COMMENTS: N/A               CXR Results  (Last 48 hours)    None            Medical Decision Making   I am the first provider for this patient. I reviewed the vital signs, available nursing notes, past medical history, past surgical history, family history and social history. Vital Signs-Reviewed the patient's vital signs. Patient Vitals for the past 12 hrs:   Temp Pulse Resp BP SpO2   09/05/20 1336 98.4 °F (36.9 °C) 65 13 (!) 129/100 100 %         Records Reviewed: Nursing Notes and Old Medical Records    Provider Notes (Medical Decision Making):   DDx: GC/CT, BV, trichomonas, UTI    Pt presents with c/o possible STI exposure. UA and GC/CT to evaluate. Pt denies any symptoms currently. Will empirically treat.      Of note, pt was evaluated in this ED two days previously, and he reports that his symptoms have significantly improved. He has some mild diarrhea but otherwise has clinically improved. ED Course:   Initial assessment performed. The patients presenting problems have been discussed, and they are in agreement with the care plan formulated and outlined with them. I have encouraged them to ask questions as they arise throughout their visit. DISCHARGE NOTE:  Cyndie Wen  results have been reviewed with him. He has been counseled regarding his diagnosis. He verbally conveys understanding and agreement of the signs, symptoms, diagnosis, treatment and prognosis and additionally agrees to follow up as recommended with Dr. None in 24 - 48 hours. He also agrees with the care-plan and conveys that all of his questions have been answered. I have also put together some discharge instructions for him that include: 1) educational information regarding their diagnosis, 2) how to care for their diagnosis at home, as well a 3) list of reasons why they would want to return to the ED prior to their follow-up appointment, should their condition change. He and/or family's questions have been answered. I have encouraged them to see the official results in [facility]\" or to retrieve the specifics of their results from medical records. PLAN:  1. Return precautions as discussed  2. Follow-up with providers as directed  3. Medications as prescribed    Return to ED if worse     Diagnosis     Clinical Impression:   1. Possible exposure to STD            This note will not be viewable in [address]

## 2020-09-05 NOTE — DISCHARGE INSTRUCTIONS
Patient Education        Exposure to Sexually Transmitted Infections: Care Instructions  Your Care Instructions  Sexually transmitted infections (STIs) are those diseases spread by sexual contact. There are at least 20 different STIs, including chlamydia, gonorrhea, syphilis, and human immunodeficiency virus (HIV), which causes AIDS. Bacteria-caused STIs can be treated and cured. STIs caused by viruses, such as HIV, can be treated but not cured. Some STIs can reduce a woman's chances of getting pregnant in the future. STIs are spread during sexual contact, such as vaginal intercourse and oral or anal sex. Follow-up care is a key part of your treatment and safety. Be sure to make and go to all appointments, and call your doctor if you are having problems. It's also a good idea to know your test results and keep a list of the medicines you take. How can you care for yourself at home? · Your doctor may have given you a shot of antibiotics. If your doctor prescribed antibiotic pills, take them as directed. Do not stop taking them just because you feel better. You need to take the full course of antibiotics. · Do not have sexual contact while you have symptoms of an STI or are being treated for an STI. · Tell your sex partner (or partners) that he or she will need treatment. · If you are a woman, do not douche. Douching changes the normal balance of bacteria in the vagina and may spread an infection up into your reproductive organs. To prevent exposure to STIs in the future  · Use latex condoms every time you have sex. Use them from the beginning to the end of sexual contact. · Talk to your partner before you have sex. Find out if he or she has or is at risk for any STI. Keep in mind that a person may be able to spread an STI even if he or she does not have symptoms. · Do not have sex if you are being treated for an STI.   · Do not have sex with anyone who has symptoms of an STI, such as sores on the genitals or mouth.  · Having one sex partner (who does not have STIs and does not have sex with anyone else) is a good way to avoid STIs. When should you call for help? Call your doctor now or seek immediate medical care if:    · You have new pain in your belly or pelvis.     · You have symptoms of a urinary tract infection. These may include:  ? Pain or burning when you urinate. ? A frequent need to urinate without being able to pass much urine. ? Pain in the flank, which is just below the rib cage and above the waist on either side of the back. ? Blood in your urine. ? A fever.     · You have new or worsening pain or swelling in the scrotum. Watch closely for changes in your health, and be sure to contact your doctor if:    · You have unusual vaginal bleeding.     · You have a discharge from the vagina or penis.     · You have any new symptoms, such as sores, bumps, rashes, blisters, or warts.     · You have itching, tingling, pain, or burning in the genital or anal area.     · You think you may have an STI. Where can you learn more? Go to http://www.gray.com/  Enter M049 in the search box to learn more about \"Exposure to Sexually Transmitted Infections: Care Instructions. \"  Current as of: February 26, 2020               Content Version: 12.6  © 2006-2020 [employer]. Care instructions adapted under license by [employer] (which disclaims liability or warranty for this information). If you have questions about a medical condition or this instruction, always ask your healthcare professional. [place] [address] any warranty or liability for your use of this information. Patient Education        Elevated Blood Pressure: Care Instructions  Your Care Instructions    Blood pressure is a measure of how hard the blood pushes against the walls of your arteries. It's normal for blood pressure to go up and down throughout the day.  But if it stays up over time, you have high blood pressure. Two numbers tell you your blood pressure. The first number is the systolic pressure. It shows how hard the blood pushes when your heart is pumping. The second number is the diastolic pressure. It shows how hard the blood pushes between heartbeats, when your heart is relaxed and filling with blood. An ideal blood pressure in adults is less than 120/80 (say \"120 over 80\"). High blood pressure is 140/90 or higher. You have high blood pressure if your top number is 140 or higher or your bottom number is 90 or higher, or both. The main test for high blood pressure is simple, fast, and painless. To diagnose high blood pressure, your doctor will test your blood pressure at different times. After testing your blood pressure, your doctor may ask you to test it again when you are home. If you are diagnosed with high blood pressure, you can work with your doctor to make a long-term plan to manage it. Follow-up care is a key part of your treatment and safety. Be sure to make and go to all appointments, and call your doctor if you are having problems. It's also a good idea to know your test results and keep a list of the medicines you take. How can you care for yourself at home? · Do not smoke. Smoking increases your risk for heart attack and stroke. If you need help quitting, talk to your doctor about stop-smoking programs and medicines. These can increase your chances of quitting for good. · Stay at a healthy weight. · Try to limit how much sodium you eat to less than 2,300 milligrams (mg) a day. Your doctor may ask you to try to eat less than 1,500 mg a day. · Be physically active. Get at least 30 minutes of exercise on most days of the week. Walking is a good choice. You also may want to do other activities, such as running, swimming, cycling, or playing tennis or team sports. · Avoid or limit alcohol. Talk to your doctor about whether you can drink any alcohol.   · Eat plenty of fruits, vegetables, and low-fat dairy products. Eat less saturated and total fats. · Learn how to check your blood pressure at home. When should you call for help? Call your doctor now or seek immediate medical care if:  ? · Your blood pressure is much higher than normal (such as 180/110 or higher). ? · You think high blood pressure is causing symptoms such as:  ¨ Severe headache. ¨ Blurry vision. ? Watch closely for changes in your health, and be sure to contact your doctor if:  ? · You do not get better as expected. Where can you learn more? Go to http://jonn-manuel.info/. Enter U990 in the search box to learn more about \"Elevated Blood Pressure: Care Instructions. \"  Current as of: September 21, 2016  Content Version: 11.4  © 9076-5831 [employer]. Care instructions adapted under license by [employer] (which disclaims liability or warranty for this information). If you have questions about a medical condition or this instruction, always ask your healthcare professional. [place] [address] any warranty or liability for your use of this information.

## 2023-05-10 RX ORDER — ACETAMINOPHEN 325 MG/1
TABLET ORAL EVERY 6 HOURS PRN
COMMUNITY
Start: 2020-09-03

## 2024-02-29 ENCOUNTER — APPOINTMENT (OUTPATIENT)
Facility: HOSPITAL | Age: 29
End: 2024-02-29
Payer: COMMERCIAL

## 2024-02-29 ENCOUNTER — HOSPITAL ENCOUNTER (EMERGENCY)
Facility: HOSPITAL | Age: 29
Discharge: HOME | End: 2024-02-29
Payer: COMMERCIAL

## 2024-02-29 VITALS
WEIGHT: 180 LBS | SYSTOLIC BLOOD PRESSURE: 137 MMHG | HEART RATE: 67 BPM | TEMPERATURE: 98.2 F | DIASTOLIC BLOOD PRESSURE: 94 MMHG | BODY MASS INDEX: 23.86 KG/M2 | RESPIRATION RATE: 16 BRPM | OXYGEN SATURATION: 99 % | HEIGHT: 73 IN

## 2024-02-29 DIAGNOSIS — M79.672 LEFT FOOT PAIN: Primary | ICD-10-CM

## 2024-02-29 PROCEDURE — 99283 EMERGENCY DEPT VISIT LOW MDM: CPT

## 2024-02-29 PROCEDURE — 73630 X-RAY EXAM OF FOOT: CPT

## 2024-02-29 RX ORDER — NAPROXEN 500 MG/1
500 TABLET ORAL 2 TIMES DAILY
Qty: 60 TABLET | Refills: 0 | Status: SHIPPED | OUTPATIENT
Start: 2024-02-29

## 2024-02-29 ASSESSMENT — PAIN DESCRIPTION - LOCATION: LOCATION: FOOT

## 2024-02-29 ASSESSMENT — PAIN DESCRIPTION - ORIENTATION: ORIENTATION: LEFT

## 2024-02-29 ASSESSMENT — PAIN SCALES - GENERAL: PAINLEVEL_OUTOF10: 9

## 2024-02-29 NOTE — DISCHARGE INSTRUCTIONS
appointment with your family doctor or the physician you were referred to for follow-up of this visit as instructed on your discharge paperwork. Return to the ER if you are unable to be seen or if you are unable to be seen in a timely manner.    Should you experience abdominal pain lasting greater than 6 hours, chest pain, difficulty breathing, fever/chills, numbness/tingling, skin changes or other symptoms that concern you, return to the ED sooner. If you feel worse over the next 24 hours, please return to the ED. We are available 24 hours a day. Thank you for trusting us with your care!

## 2024-02-29 NOTE — ED PROVIDER NOTES
[facility] EMERGENCY DEPT  EMERGENCY DEPARTMENT ENCOUNTER       Pt Name: Ramu Hernandez  MRN: [id number]  Birthdate 1995  Date of evaluation: 2/29/2024  Provider: LEIF Guzman   PCP: No primary care provider on file.  Note Started: 1:31 PM EST 2/29/24     CHIEF COMPLAINT       Chief Complaint   Patient presents with    Foot Pain     Pt arrives via wheelchair to triage, reports L foot pain starting yesterday morning, no known injury. Reports he can put pressure on his foot but when he takes pressure off pain is more severe.      HISTORY OF PRESENT ILLNESS: 1 or more elements      History From: Patient  HPI Limitations: None     Ramu Hernandez is a 28 y.o. male who presents by POV with complaints of non-traumatic left foot pain. He awoke with pain this morning. Denies known insult or injury. Notes that he is on his feet all day at work. No remote foot injuries. Pain is constant, located along the lateral foot and worsens with ambulation. Pain does not radiate. There is no numbness or tingling and no treatment provided prior to arrival.      Nursing Notes were all reviewed and agreed with or any disagreements were addressed in the HPI.     REVIEW OF SYSTEMS      Review of Systems     Positives and Pertinent negatives as per HPI.    PAST HISTORY     Past Medical History:  Past Medical History:   Diagnosis Date    H/O seasonal allergies     Headache(784.0)     Migraines        Past Surgical History:  No past surgical history on file.    Family History:  No family history on file.    Social History:  Social History     Tobacco Use    Smoking status: Never    Smokeless tobacco: Never   Substance Use Topics    Alcohol use: No    Drug use: No       Allergies:  No Known Allergies    CURRENT MEDICATIONS      Previous Medications    ACETAMINOPHEN (TYLENOL) 325 MG TABLET    Take by mouth every 6 hours as needed       SCREENINGS               No data recorded        PHYSICAL EXAM      ED Triage Vitals [02/29/24 1208]